# Patient Record
Sex: MALE | Race: WHITE | Employment: UNEMPLOYED | URBAN - METROPOLITAN AREA
[De-identification: names, ages, dates, MRNs, and addresses within clinical notes are randomized per-mention and may not be internally consistent; named-entity substitution may affect disease eponyms.]

---

## 2017-02-20 RX ORDER — CARVEDILOL 25 MG/1
TABLET ORAL
Qty: 180 TABLET | Refills: 0 | Status: SHIPPED | OUTPATIENT
Start: 2017-02-20 | End: 2017-05-18 | Stop reason: SDUPTHER

## 2017-04-13 RX ORDER — SPIRONOLACTONE 25 MG/1
TABLET ORAL
Qty: 90 TABLET | Refills: 0 | Status: SHIPPED | OUTPATIENT
Start: 2017-04-13 | End: 2017-04-18 | Stop reason: SDUPTHER

## 2017-04-18 RX ORDER — LISINOPRIL 20 MG/1
TABLET ORAL
Qty: 30 TABLET | Refills: 0 | Status: SHIPPED | OUTPATIENT
Start: 2017-04-18 | End: 2017-07-17 | Stop reason: SDUPTHER

## 2017-04-18 RX ORDER — SPIRONOLACTONE 25 MG/1
TABLET ORAL
Qty: 30 TABLET | Refills: 0 | Status: SHIPPED | OUTPATIENT
Start: 2017-04-18 | End: 2017-07-17 | Stop reason: SDUPTHER

## 2017-05-18 RX ORDER — CARVEDILOL 25 MG/1
TABLET ORAL
Qty: 180 TABLET | Refills: 1 | Status: SHIPPED | OUTPATIENT
Start: 2017-05-18 | End: 2017-07-17 | Stop reason: SDUPTHER

## 2017-05-19 RX ORDER — LISINOPRIL 20 MG/1
20 TABLET ORAL DAILY
Qty: 30 TABLET | Refills: 6 | Status: SHIPPED | OUTPATIENT
Start: 2017-05-19 | End: 2017-07-17 | Stop reason: SDUPTHER

## 2017-05-23 LAB
AVERAGE GLUCOSE: NORMAL
HBA1C MFR BLD: 5.3 %

## 2017-07-17 ENCOUNTER — PROCEDURE VISIT (OUTPATIENT)
Dept: CARDIOLOGY CLINIC | Age: 62
End: 2017-07-17

## 2017-07-17 ENCOUNTER — OFFICE VISIT (OUTPATIENT)
Dept: CARDIOLOGY CLINIC | Age: 62
End: 2017-07-17

## 2017-07-17 VITALS
DIASTOLIC BLOOD PRESSURE: 72 MMHG | HEIGHT: 72 IN | WEIGHT: 222 LBS | BODY MASS INDEX: 30.07 KG/M2 | HEART RATE: 77 BPM | SYSTOLIC BLOOD PRESSURE: 134 MMHG

## 2017-07-17 DIAGNOSIS — E78.5 HYPERLIPIDEMIA WITH TARGET LDL LESS THAN 70: ICD-10-CM

## 2017-07-17 DIAGNOSIS — Z95.810 AUTOMATIC IMPLANTABLE CARDIOVERTER-DEFIBRILLATOR IN SITU: Primary | ICD-10-CM

## 2017-07-17 DIAGNOSIS — I25.5 CARDIOMYOPATHY, ISCHEMIC: ICD-10-CM

## 2017-07-17 DIAGNOSIS — E78.1 HYPERTRIGLYCERIDEMIA: ICD-10-CM

## 2017-07-17 DIAGNOSIS — I50.22 SYSTOLIC CHF, CHRONIC (HCC): Primary | ICD-10-CM

## 2017-07-17 DIAGNOSIS — Z72.0 TOBACCO ABUSE: ICD-10-CM

## 2017-07-17 DIAGNOSIS — I25.10 CORONARY ARTERY DISEASE INVOLVING NATIVE CORONARY ARTERY OF NATIVE HEART WITHOUT ANGINA PECTORIS: ICD-10-CM

## 2017-07-17 PROCEDURE — G8427 DOCREV CUR MEDS BY ELIG CLIN: HCPCS | Performed by: NURSE PRACTITIONER

## 2017-07-17 PROCEDURE — 99214 OFFICE O/P EST MOD 30 MIN: CPT | Performed by: NURSE PRACTITIONER

## 2017-07-17 PROCEDURE — G8417 CALC BMI ABV UP PARAM F/U: HCPCS | Performed by: NURSE PRACTITIONER

## 2017-07-17 PROCEDURE — 4004F PT TOBACCO SCREEN RCVD TLK: CPT | Performed by: NURSE PRACTITIONER

## 2017-07-17 PROCEDURE — 3017F COLORECTAL CA SCREEN DOC REV: CPT | Performed by: NURSE PRACTITIONER

## 2017-07-17 PROCEDURE — G8598 ASA/ANTIPLAT THER USED: HCPCS | Performed by: NURSE PRACTITIONER

## 2017-07-17 PROCEDURE — 93279 PRGRMG DEV EVAL PM/LDLS PM: CPT | Performed by: INTERNAL MEDICINE

## 2017-07-17 RX ORDER — ATORVASTATIN CALCIUM 40 MG/1
40 TABLET, FILM COATED ORAL DAILY
Qty: 90 TABLET | Refills: 3 | Status: SHIPPED | OUTPATIENT
Start: 2017-07-17 | End: 2018-07-17 | Stop reason: SDUPTHER

## 2017-07-17 RX ORDER — LISINOPRIL 20 MG/1
TABLET ORAL
Qty: 90 TABLET | Refills: 3 | Status: SHIPPED | OUTPATIENT
Start: 2017-07-17 | End: 2018-08-20 | Stop reason: SDUPTHER

## 2017-07-17 RX ORDER — SPIRONOLACTONE 25 MG/1
TABLET ORAL
Qty: 90 TABLET | Refills: 3 | Status: SHIPPED | OUTPATIENT
Start: 2017-07-17 | End: 2018-08-20 | Stop reason: SDUPTHER

## 2017-07-17 RX ORDER — CARVEDILOL 25 MG/1
TABLET ORAL
Qty: 180 TABLET | Refills: 1 | Status: SHIPPED | OUTPATIENT
Start: 2017-07-17 | End: 2018-07-05 | Stop reason: SDUPTHER

## 2017-07-17 RX ORDER — FUROSEMIDE 20 MG/1
20 TABLET ORAL 2 TIMES DAILY
Qty: 180 TABLET | Refills: 3 | Status: SHIPPED | OUTPATIENT
Start: 2017-07-17 | End: 2018-08-20 | Stop reason: SDUPTHER

## 2017-07-17 RX ORDER — BUDESONIDE AND FORMOTEROL FUMARATE DIHYDRATE 160; 4.5 UG/1; UG/1
2 AEROSOL RESPIRATORY (INHALATION) 2 TIMES DAILY
COMMUNITY

## 2018-02-26 ENCOUNTER — TELEPHONE (OUTPATIENT)
Dept: CARDIOLOGY CLINIC | Age: 63
End: 2018-02-26

## 2018-02-26 NOTE — TELEPHONE ENCOUNTER
Continue with compression socks, take meds as prescribed continue daily weights. Looks like he needs a follow up yrn.

## 2018-02-26 NOTE — TELEPHONE ENCOUNTER
Pt called stating he has had swelling off and on in his right leg for the last 5 days. Patient states he was swollen this morning and he put on his compression socks and at time of calling the office the swelling had subsided. Patient stated no other symptoms.

## 2018-02-26 NOTE — TELEPHONE ENCOUNTER
Pt called back and yes he's weighing himself. He is 214 LB now. SOB comes and goes, uses Nebulizer. He is taking all his meds.

## 2018-02-27 NOTE — TELEPHONE ENCOUNTER
Spoke with Zahira Every he says he has had on compression socks and swelling went down. He has appt 3/20 with JUAN ALBERTO. He verbalized understanding.

## 2018-03-13 ENCOUNTER — TELEPHONE (OUTPATIENT)
Dept: CARDIOLOGY CLINIC | Age: 63
End: 2018-03-13

## 2018-03-13 NOTE — TELEPHONE ENCOUNTER
I cannot give clearance until I see him next week. I have not seen him in more than a year.   JUAN ALBERTO

## 2018-03-20 ENCOUNTER — OFFICE VISIT (OUTPATIENT)
Dept: CARDIOLOGY CLINIC | Age: 63
End: 2018-03-20

## 2018-03-20 ENCOUNTER — PROCEDURE VISIT (OUTPATIENT)
Dept: CARDIOLOGY CLINIC | Age: 63
End: 2018-03-20

## 2018-03-20 VITALS
BODY MASS INDEX: 28.71 KG/M2 | DIASTOLIC BLOOD PRESSURE: 70 MMHG | WEIGHT: 212 LBS | OXYGEN SATURATION: 97 % | SYSTOLIC BLOOD PRESSURE: 108 MMHG | HEIGHT: 72 IN | HEART RATE: 80 BPM

## 2018-03-20 DIAGNOSIS — E78.1 HYPERTRIGLYCERIDEMIA: ICD-10-CM

## 2018-03-20 DIAGNOSIS — Z95.810 AUTOMATIC IMPLANTABLE CARDIOVERTER-DEFIBRILLATOR IN SITU: ICD-10-CM

## 2018-03-20 DIAGNOSIS — I25.10 CORONARY ARTERY DISEASE INVOLVING NATIVE CORONARY ARTERY OF NATIVE HEART WITHOUT ANGINA PECTORIS: ICD-10-CM

## 2018-03-20 DIAGNOSIS — I25.5 CARDIOMYOPATHY, ISCHEMIC: ICD-10-CM

## 2018-03-20 DIAGNOSIS — I50.22 SYSTOLIC CHF, CHRONIC (HCC): Primary | ICD-10-CM

## 2018-03-20 DIAGNOSIS — R53.83 FATIGUE, UNSPECIFIED TYPE: ICD-10-CM

## 2018-03-20 DIAGNOSIS — Z01.810 PREOP CARDIOVASCULAR EXAM: ICD-10-CM

## 2018-03-20 DIAGNOSIS — M79.10 MYALGIA: ICD-10-CM

## 2018-03-20 DIAGNOSIS — Z95.810 AUTOMATIC IMPLANTABLE CARDIOVERTER-DEFIBRILLATOR IN SITU: Primary | ICD-10-CM

## 2018-03-20 PROCEDURE — G8427 DOCREV CUR MEDS BY ELIG CLIN: HCPCS | Performed by: INTERNAL MEDICINE

## 2018-03-20 PROCEDURE — G8419 CALC BMI OUT NRM PARAM NOF/U: HCPCS | Performed by: INTERNAL MEDICINE

## 2018-03-20 PROCEDURE — G8484 FLU IMMUNIZE NO ADMIN: HCPCS | Performed by: INTERNAL MEDICINE

## 2018-03-20 PROCEDURE — 4004F PT TOBACCO SCREEN RCVD TLK: CPT | Performed by: INTERNAL MEDICINE

## 2018-03-20 PROCEDURE — G8599 NO ASA/ANTIPLAT THER USE RNG: HCPCS | Performed by: INTERNAL MEDICINE

## 2018-03-20 PROCEDURE — 93282 PRGRMG EVAL IMPLANTABLE DFB: CPT | Performed by: INTERNAL MEDICINE

## 2018-03-20 PROCEDURE — 99214 OFFICE O/P EST MOD 30 MIN: CPT | Performed by: INTERNAL MEDICINE

## 2018-03-20 PROCEDURE — 3017F COLORECTAL CA SCREEN DOC REV: CPT | Performed by: INTERNAL MEDICINE

## 2018-03-20 NOTE — PROGRESS NOTES
08/03/2016    WBC 9.4 03/02/2016    WBC 11.0 10/16/2015    RBC 4.02 08/03/2016    RBC 4.46 03/02/2016    RBC 4.31 10/16/2015    HGB 13.2 08/03/2016    HGB 14.4 03/02/2016    HGB 13.7 10/16/2015    HCT 38.4 08/03/2016    HCT 42.0 03/02/2016    HCT 40.5 10/16/2015    MCV 95.4 08/03/2016    MCV 94.2 03/02/2016    MCV 93.9 10/16/2015    RDW 13.9 08/03/2016    RDW 13.6 03/02/2016    RDW 12.7 10/16/2015     08/03/2016     03/02/2016     10/16/2015     BMP:   Lab Results   Component Value Date     08/03/2016     03/02/2016     10/12/2015    K 4.4 08/03/2016    K 4.8 03/02/2016    K 5.0 10/12/2015     08/03/2016    CL 98 03/02/2016    CL 99 10/12/2015    CO2 29 08/03/2016    CO2 29 03/02/2016    CO2 24 10/12/2015    BUN 14 08/03/2016    BUN 16 03/02/2016    BUN 21 10/12/2015    CREATININE 0.80 08/03/2016    CREATININE 0.8 03/02/2016    CREATININE 0.8 10/12/2015     BNP:   Lab Results   Component Value Date    BNP 93.7 04/25/2013    .2 08/14/2012    .8 06/18/2012     PT/INR:   No results found for: PROTIME  FASTING LIPID PANEL:  Lab Results   Component Value Date    CHOL 135 08/03/2016    HDL 36 08/03/2016    HDL 42 05/24/2012    TRIG 84 08/03/2016   Recent Labs (haley) 2/9/18  , LDL 54, HDL 31,     Assessment:    1. Cardiomyopathy, ischemic : 9/24/15 stress test showed LV cavity is enlarged and has severely reduced left ventricular function  @34% with hypokinesis of the infero-lateral wall     2. Benign hypertensive heart disease with heart failure: stable BP: 108/70     3. Hyperlipidemia: 12/21/15 , Triglycerides 173,  HDL 42,  LDL 66   4. CAD: stable   5. Tobacco abuse : cessation education given   6. CHF:  LV systolic dysfunction:  He is remarkably stable and has been doing a good job of controlling his Fluid weight. Coreg 25 mg po bid. Lisinopril 20 mg po bid for hypertension and CHF  7.   ICD:  3/2/16 Interrogation and programming evaluation

## 2018-03-20 NOTE — PROGRESS NOTES
Patient comes in for programming evaluation for his defibrillator. All sensing and pacing parameters are within normal range. No changes need to be made at this time. Please see interrogation for more detail. In the last 3 months he has had 5 new NSVT/PAT events-longest 14 seconds. Patient to see Dr. Kodi Rizo today. Patient will follow up in 3-6 onths in office. Offered remote monitor again-pt declined.

## 2018-03-20 NOTE — LETTER
 furosemide (LASIX) 20 MG tablet Take 1 tablet by mouth 2 times daily 180 tablet 3    aspirin 325 MG tablet Take 1 tablet by mouth daily (Patient taking differently: Take 500 mg by mouth daily Takes 500 mg ASA one in the morning and one in the evening) 30 tablet 3    albuterol sulfate HFA (VENTOLIN HFA) 108 (90 BASE) MCG/ACT inhaler Inhale 2 puffs into the lungs every 6 hours as needed for Wheezing 1 Inhaler 0    albuterol (PROVENTIL) (2.5 MG/3ML) 0.083% nebulizer solution Take 3 mLs by nebulization every 6 hours as needed for Wheezing 120 each 0    acetaminophen (TYLENOL) 325 MG tablet Take 650 mg by mouth every 6 hours as needed for Pain      metFORMIN (GLUCOPHAGE) 1000 MG tablet TAKE 1 TABLET BY MOUTH TWO TIMES A DAY WITH MEALS 75 tablet 11    potassium phosphate, monobasic, (K-PHOS) 500 MG tablet Take 500 mg by mouth daily. OTC      multivitamin (THERAGRAN) per tablet Take 1 tablet by mouth daily.  nabumetone (RELAFEN) 750 MG tablet Take 750 mg by mouth daily      glipiZIDE (GLUCOTROL) 5 MG tablet TAKE ONE TABLET BY MOUTH TWICE A DAY BEFORE MEALS (Patient taking differently: TAKE ONE TABLET BY MOUTH TWICE A DAY BEFORE MEALS prn) 60 tablet 0    glipiZIDE (GLUCOTROL) 5 MG tablet TAKE ONE TABLET BY MOUTH with dinner (Patient taking differently: TAKE ONE TABLET BY MOUTH with dinner prn) 90 tablet 0     No current facility-administered medications for this visit.         Immunization History   Administered Date(s) Administered    Influenza Virus Vaccine 01/22/2007, 11/11/2015    Pneumococcal Polysaccharide ([id number]) 11/11/2015    Tdap (Boostrix, Adacel) 04/16/2010       Patient Active Problem List   Diagnosis    Automatic implantable cardioverter-defibrillator in situ    Tobacco abuse    Diabetes type 2, controlled ([address]    Moderate COPD [facility]ch[facility] obstructive [facility] disease) [facility]    Cardiomyopathy, ischemic    Coronary artery disease involving native coronary artery of native heart

## 2018-03-23 ENCOUNTER — OFFICE VISIT (OUTPATIENT)
Dept: VASCULAR SURGERY | Age: 63
End: 2018-03-23

## 2018-03-23 VITALS
HEIGHT: 72 IN | HEART RATE: 89 BPM | WEIGHT: 211.6 LBS | OXYGEN SATURATION: 100 % | BODY MASS INDEX: 28.66 KG/M2 | DIASTOLIC BLOOD PRESSURE: 78 MMHG | SYSTOLIC BLOOD PRESSURE: 128 MMHG

## 2018-03-23 DIAGNOSIS — Z98.890 H/O CAROTID ENDARTERECTOMY: ICD-10-CM

## 2018-03-23 DIAGNOSIS — R09.89 LEFT CAROTID BRUIT: ICD-10-CM

## 2018-03-23 DIAGNOSIS — I72.2 RENAL ARTERY ANEURYSM (HCC): ICD-10-CM

## 2018-03-23 PROCEDURE — G8427 DOCREV CUR MEDS BY ELIG CLIN: HCPCS | Performed by: SURGERY

## 2018-03-23 PROCEDURE — G8419 CALC BMI OUT NRM PARAM NOF/U: HCPCS | Performed by: SURGERY

## 2018-03-23 PROCEDURE — G8484 FLU IMMUNIZE NO ADMIN: HCPCS | Performed by: SURGERY

## 2018-03-23 PROCEDURE — 3017F COLORECTAL CA SCREEN DOC REV: CPT | Performed by: SURGERY

## 2018-03-23 PROCEDURE — 99214 OFFICE O/P EST MOD 30 MIN: CPT | Performed by: SURGERY

## 2018-03-23 NOTE — COMMUNICATION BODY
pulmonary disease) [facility]     Diabetes mellitus ([facility] [facility]     Edema     lower extremities    History of right-sided carotid endarterectomy 10/15/2015    Hyperlipidemia     mixed    Hyperlipidemia     Hypertension     Metabolic syndrome     MI (myocardial infarction) 1991    Pneumonia     Pulmonary hypertension     Smoker     Tobacco abuse     Type II or unspecified type diabetes mellitus without mention of complication, not stated as uncontrolled      Past Surgical History:   Procedure Laterality Date    CARDIAC DEFIBRILLATOR PLACEMENT  2010    CAROTID ENDARTERECTOMY Right 10/15/15    right carotid endarterectomy    CORONARY ANGIOPLASTY WITH STENT PLACEMENT  1989    INGUINAL HERNIA REPAIR Left 2013     Family History   Problem Relation Age of Onset    Heart Disease Mother     Alcohol Abuse Mother     Other Father     Alcohol Abuse Father     Heart Disease Father     Alcohol Abuse Sister     Heart Disease Sister      CHF    COPD Sister     High Blood Pressure Sister     Heart Disease Maternal Grandfather      Social History     Social History    Marital status:      Spouse name: N/A    Number of children: N/A    Years of education: N/A     Occupational History    Not on file. Social History Main Topics    Smoking status: Current Every Day Smoker     Packs/day: 1.00     Years: 46.00     Types: Cigarettes    Smokeless tobacco: Never Used    Alcohol use 0.0 oz/week    Drug use: No    Sexual activity: Yes     Partners: Female      Comment: Rare     Other Topics Concern    Not on file     Social History Narrative    ** Merged History Encounter **          Review of Systems:   · Constitutional: there has been no unanticipated weight loss. There's been no change in energy level, sleep pattern, or activity level. · Eyes: No visual changes or diplopia. No scleral icterus. · ENT: No Headaches, hearing loss or vertigo. No mouth sores or sore throat.   · Cardiovascular: Reviewed in

## 2018-03-23 NOTE — PROGRESS NOTES
Outpatient Consultation / H&P    Date of Consultation:  3/23/2018    PCP:  Toby Gómez CNP     Referring Provider:  Dr. Shira Breen     Chief Complaint:   Chief Complaint   Patient presents with    Other     patient referred by Dr Jeimy Lozano for renal artery aneurysm, as well as thoracic ascending and descending. pamlr        History of Present Illness: We are asked to see this patient in consultation by Dr. Shira Breen regarding CT findings. Bettye Edwards is a 58 y.o. male who underwent CT scan for lung cancer screening. Our cuts of the scan demonstrated rim calcification in the left renal hilum consistent with a small renal artery aneurysm. He does have a history of vascular disease- I had performed a right carotid endarterectomy in 2015. Patient denies any chest pain back pain or abdominal pain. He denies any renal insufficiency or hematuria.      Past Medical History:  Past Medical History:   Diagnosis Date    Anxiety     Asymptomatic stenosis of right carotid artery 10/2/2015    CAD (coronary artery disease)     CAD (coronary artery disease)     angioplasty/stents 1989    Cardiac defibrillator in place     Cardiomyopathy ([address]     LVEF - approx 25%    COPD (chronic obstructive pulmonary disease) (HCC)     Diabetes mellitus ([address]     Edema     lower extremities    History of right-sided carotid endarterectomy 10/15/2015    Hyperlipidemia     mixed    Hyperlipidemia     Hypertension     Metabolic syndrome     MI (myocardial infarction) 1991    Pneumonia     Pulmonary hypertension     Renal artery aneurysm ([facility] [address] [address] 3/23/2018    Smoker     Tobacco abuse     Type II or unspecified type diabetes mellitus without mention of complication, not stated as uncontrolled        Past Surgical History:  Past Surgical History:   Procedure Laterality Date    CARDIAC DEFIBRILLATOR PLACEMENT  2010    CAROTID ENDARTERECTOMY Right 10/15/15    right carotid endarterectomy    CORONARY ANGIOPLASTY WITH STENT (GLUCOTROL) 5 MG tablet TAKE ONE TABLET BY MOUTH TWICE A DAY BEFORE MEALS  Patient taking differently: TAKE ONE TABLET BY MOUTH TWICE A DAY BEFORE MEALS prn 7/12/16   Agatha Cardenas MD   glipiZIDE (GLUCOTROL) 5 MG tablet TAKE ONE TABLET BY MOUTH with dinner  Patient taking differently: TAKE ONE TABLET BY MOUTH with dinner prn 5/4/16   Franklin Cardenas MD        Allergies:  Celexa [citalopram hydrobromide] and Lipitor [atorvastatin]     Social History:      Social History     Social History    Marital status:      Spouse name: N/A    Number of children: N/A    Years of education: N/A     Occupational History    Not on file. Social History Main Topics    Smoking status: Current Every Day Smoker     Packs/day: 1.00     Years: 46.00     Types: Cigarettes    Smokeless tobacco: Never Used    Alcohol use 0.0 oz/week    Drug use: No    Sexual activity: Yes     Partners: Female      Comment: Rare     Other Topics Concern    Not on file     Social History Narrative    ** Merged History Encounter **            Family History:        Problem Relation Age of Onset    Heart Disease Mother     Alcohol Abuse Mother     Other Father     Alcohol Abuse Father     Heart Disease Father     Alcohol Abuse Sister     Heart Disease Sister      CHF    COPD Sister     High Blood Pressure Sister     Heart Disease Maternal Grandfather        Review of Systems:  A 14 point review of systems was completed. Pertinent positives identified in the HPI, all other review of systems negative. Physical Examination:    /78 (Site: Left Arm)   Pulse 89   Ht 6' (1.829 m)   Wt 211 lb 9.6 oz (96 kg)   SpO2 100%   BMI 28.70 kg/m²     Weight: 211 lb 9.6 oz (96 kg)       General appearance: NAD  Eyes: PERRLA  Neck: no JVD, no lymphadenopathy. Respiratory: effort is unlabored, no crackles, wheezes or rubs. Cardiovascular: regular, no murmur. Left carotid bruits. Well-healed right endarterectomy scar.   Pulses:    radial

## 2018-03-26 ENCOUNTER — TELEPHONE (OUTPATIENT)
Dept: CARDIOTHORACIC SURGERY | Age: 63
End: 2018-03-26

## 2018-03-31 PROBLEM — R07.9 CHEST PAIN: Status: ACTIVE | Noted: 2018-03-31

## 2018-04-02 ENCOUNTER — TELEPHONE (OUTPATIENT)
Dept: CARDIOLOGY CLINIC | Age: 63
End: 2018-04-02

## 2018-04-03 ENCOUNTER — TELEPHONE (OUTPATIENT)
Dept: CARDIOLOGY CLINIC | Age: 63
End: 2018-04-03

## 2018-04-04 ENCOUNTER — HOSPITAL ENCOUNTER (OUTPATIENT)
Dept: SURGERY | Age: 63
Discharge: HOME | End: 2018-04-23
Admitting: INTERNAL MEDICINE

## 2018-04-04 VITALS
WEIGHT: 199 LBS | RESPIRATION RATE: 18 BRPM | HEART RATE: 89 BPM | SYSTOLIC BLOOD PRESSURE: 123 MMHG | TEMPERATURE: 97.5 F | OXYGEN SATURATION: 95 % | BODY MASS INDEX: 26.95 KG/M2 | DIASTOLIC BLOOD PRESSURE: 88 MMHG | HEIGHT: 72 IN

## 2018-04-04 DIAGNOSIS — Z12.11 ENCOUNTER FOR SCREENING FOR MALIGNANT NEOPLASM OF COLON: ICD-10-CM

## 2018-04-04 LAB
GLUCOSE BLD-MCNC: 120 MG/DL (ref 70–99)
PERFORMED ON: ABNORMAL

## 2018-04-04 RX ORDER — LIDOCAINE HYDROCHLORIDE 10 MG/ML
1 INJECTION, SOLUTION EPIDURAL; INFILTRATION; INTRACAUDAL; PERINEURAL ONCE
Status: DISCONTINUED | OUTPATIENT
Start: 2018-04-04 | End: 2018-04-06 | Stop reason: HOSPADM

## 2018-04-04 RX ORDER — SODIUM CHLORIDE, SODIUM LACTATE, POTASSIUM CHLORIDE, CALCIUM CHLORIDE 600; 310; 30; 20 MG/100ML; MG/100ML; MG/100ML; MG/100ML
1000 INJECTION, SOLUTION INTRAVENOUS ONCE
Status: COMPLETED | OUTPATIENT
Start: 2018-04-04 | End: 2018-04-04

## 2018-04-04 RX ORDER — TRAMADOL HYDROCHLORIDE 50 MG/1
50 TABLET ORAL EVERY 6 HOURS PRN
COMMUNITY
End: 2018-04-23 | Stop reason: ALTCHOICE

## 2018-04-04 RX ADMIN — SODIUM CHLORIDE, SODIUM LACTATE, POTASSIUM CHLORIDE, CALCIUM CHLORIDE 1000 ML: 600; 310; 30; 20 INJECTION, SOLUTION INTRAVENOUS at 07:11

## 2018-04-04 ASSESSMENT — PAIN SCALES - GENERAL
PAINLEVEL_OUTOF10: 0

## 2018-04-04 ASSESSMENT — PAIN - FUNCTIONAL ASSESSMENT: PAIN_FUNCTIONAL_ASSESSMENT: 0-10

## 2018-04-10 ENCOUNTER — HOSPITAL ENCOUNTER (OUTPATIENT)
Dept: VASCULAR LAB | Age: 63
Discharge: HOME | End: 2018-04-04
Attending: INTERNAL MEDICINE | Admitting: INTERNAL MEDICINE

## 2018-04-10 ENCOUNTER — HOSPITAL ENCOUNTER (OUTPATIENT)
Dept: VASCULAR LAB | Age: 63
Discharge: HOME | End: 2018-04-10
Attending: SURGERY | Admitting: SURGERY

## 2018-04-10 DIAGNOSIS — I65.23 OCCLUSION AND STENOSIS OF BILATERAL CAROTID ARTERIES: ICD-10-CM

## 2018-04-12 ENCOUNTER — TELEPHONE (OUTPATIENT)
Dept: CARDIOTHORACIC SURGERY | Age: 63
End: 2018-04-12

## 2018-04-16 ENCOUNTER — TELEPHONE (OUTPATIENT)
Dept: CARDIOTHORACIC SURGERY | Age: 63
End: 2018-04-16

## 2018-04-16 DIAGNOSIS — R09.89 LEFT CAROTID BRUIT: Primary | ICD-10-CM

## 2018-04-19 PROBLEM — Z01.810 PREOP CARDIOVASCULAR EXAM: Status: RESOLVED | Noted: 2018-03-20 | Resolved: 2018-04-19

## 2018-04-20 ENCOUNTER — HOSPITAL ENCOUNTER (OUTPATIENT)
Dept: CT IMAGING | Age: 63
Discharge: HOME | End: 2018-04-20
Attending: SURGERY | Admitting: SURGERY

## 2018-04-20 ENCOUNTER — TELEPHONE (OUTPATIENT)
Dept: CARDIOTHORACIC SURGERY | Age: 63
End: 2018-04-20

## 2018-04-20 DIAGNOSIS — Z12.11 ENCOUNTER FOR SCREENING FOR MALIGNANT NEOPLASM OF COLON: ICD-10-CM

## 2018-04-20 DIAGNOSIS — R09.89 LEFT CAROTID BRUIT: ICD-10-CM

## 2018-04-23 ENCOUNTER — SURG/PROC ORDERS (OUTPATIENT)
Dept: CARDIOTHORACIC SURGERY | Age: 63
End: 2018-04-23

## 2018-04-23 ENCOUNTER — TELEPHONE (OUTPATIENT)
Dept: CARDIOTHORACIC SURGERY | Age: 63
End: 2018-04-23

## 2018-04-23 DIAGNOSIS — I65.22 OCCLUSION OF LEFT CAROTID ARTERY: Primary | ICD-10-CM

## 2018-04-24 ENCOUNTER — HOSPITAL ENCOUNTER (OUTPATIENT)
Dept: OTHER | Age: 63
Discharge: HOME | End: 2018-04-24
Attending: SURGERY | Admitting: SURGERY

## 2018-04-24 ENCOUNTER — TELEPHONE (OUTPATIENT)
Dept: CARDIOLOGY CLINIC | Age: 63
End: 2018-04-24

## 2018-04-24 ENCOUNTER — HOSPITAL ENCOUNTER (OUTPATIENT)
Dept: CARDIOLOGY | Facility: CLINIC | Age: 63
Discharge: HOME | End: 2018-04-24
Attending: INTERNAL MEDICINE | Admitting: INTERNAL MEDICINE

## 2018-04-24 DIAGNOSIS — Z01.810 ENCOUNTER FOR PREPROCEDURAL CARDIOVASCULAR EXAMINATION: ICD-10-CM

## 2018-04-24 LAB
BILIRUBIN URINE: NEGATIVE
BLOOD, URINE: NEGATIVE
CLARITY: CLEAR
COLOR: YELLOW
GLUCOSE URINE: NEGATIVE MG/DL
KETONES, URINE: NEGATIVE MG/DL
LEUKOCYTE ESTERASE, URINE: NEGATIVE
LV EF: 36 %
LVEF MODALITY: NORMAL
MICROSCOPIC EXAMINATION: NORMAL
NITRITE, URINE: NEGATIVE
PH UA: 5.5
PROTEIN UA: NEGATIVE MG/DL
SPECIFIC GRAVITY UA: 1.01
URINE TYPE: NORMAL
UROBILINOGEN, URINE: 0.2 E.U./DL

## 2018-04-25 PROBLEM — Z98.890 S/P CAROTID ENDARTERECTOMY: Status: ACTIVE | Noted: 2018-04-25

## 2018-04-25 PROBLEM — I65.22 STENOSIS OF LEFT CAROTID ARTERY: Status: ACTIVE | Noted: 2018-04-25

## 2018-05-18 ENCOUNTER — OFFICE VISIT (OUTPATIENT)
Dept: VASCULAR SURGERY | Age: 63
End: 2018-05-18

## 2018-05-18 VITALS
BODY MASS INDEX: 26.95 KG/M2 | SYSTOLIC BLOOD PRESSURE: 100 MMHG | HEART RATE: 78 BPM | DIASTOLIC BLOOD PRESSURE: 60 MMHG | OXYGEN SATURATION: 96 % | HEIGHT: 72 IN | WEIGHT: 199 LBS

## 2018-05-18 DIAGNOSIS — Z48.812 POSTOP CAROTID ENDARTERECTOMY SURVEILLANCE, ENCOUNTER FOR: ICD-10-CM

## 2018-05-18 DIAGNOSIS — Z09 POSTOP CHECK: Primary | ICD-10-CM

## 2018-05-18 PROCEDURE — 99024 POSTOP FOLLOW-UP VISIT: CPT | Performed by: SURGERY

## 2018-07-05 RX ORDER — CARVEDILOL 25 MG/1
TABLET ORAL
Qty: 180 TABLET | Refills: 2 | Status: SHIPPED | OUTPATIENT
Start: 2018-07-05

## 2018-07-17 DIAGNOSIS — E78.5 HYPERLIPIDEMIA WITH TARGET LDL LESS THAN 70: ICD-10-CM

## 2018-07-17 RX ORDER — ATORVASTATIN CALCIUM 40 MG/1
TABLET, FILM COATED ORAL
Qty: 90 TABLET | Refills: 3 | Status: SHIPPED | OUTPATIENT
Start: 2018-07-17 | End: 2019-02-08 | Stop reason: DRUGHIGH

## 2018-08-06 ENCOUNTER — OFFICE VISIT (OUTPATIENT)
Dept: URGENT CARE | Age: 63
End: 2018-08-06

## 2018-08-06 VITALS
RESPIRATION RATE: 24 BRPM | TEMPERATURE: 98.6 F | HEART RATE: 81 BPM | DIASTOLIC BLOOD PRESSURE: 68 MMHG | WEIGHT: 199 LBS | HEIGHT: 72 IN | SYSTOLIC BLOOD PRESSURE: 110 MMHG | OXYGEN SATURATION: 93 % | BODY MASS INDEX: 26.95 KG/M2

## 2018-08-06 DIAGNOSIS — J40 BRONCHITIS: Primary | ICD-10-CM

## 2018-08-06 PROCEDURE — 99203 OFFICE O/P NEW LOW 30 MIN: CPT | Performed by: EMERGENCY MEDICINE

## 2018-08-06 PROCEDURE — G8427 DOCREV CUR MEDS BY ELIG CLIN: HCPCS | Performed by: EMERGENCY MEDICINE

## 2018-08-06 PROCEDURE — 4004F PT TOBACCO SCREEN RCVD TLK: CPT | Performed by: EMERGENCY MEDICINE

## 2018-08-06 PROCEDURE — G8598 ASA/ANTIPLAT THER USED: HCPCS | Performed by: EMERGENCY MEDICINE

## 2018-08-06 PROCEDURE — 3017F COLORECTAL CA SCREEN DOC REV: CPT | Performed by: EMERGENCY MEDICINE

## 2018-08-06 PROCEDURE — G8419 CALC BMI OUT NRM PARAM NOF/U: HCPCS | Performed by: EMERGENCY MEDICINE

## 2018-08-06 RX ORDER — BENZONATATE 100 MG/1
200 CAPSULE ORAL 3 TIMES DAILY PRN
Qty: 30 CAPSULE | Refills: 0 | Status: SHIPPED | OUTPATIENT
Start: 2018-08-06 | End: 2018-08-13

## 2018-08-06 RX ORDER — AZITHROMYCIN 250 MG/1
TABLET, FILM COATED ORAL
Qty: 1 PACKET | Refills: 0 | Status: SHIPPED | OUTPATIENT
Start: 2018-08-06 | End: 2018-08-16 | Stop reason: ALTCHOICE

## 2018-08-06 RX ORDER — ALBUTEROL SULFATE 90 UG/1
2 AEROSOL, METERED RESPIRATORY (INHALATION) EVERY 6 HOURS PRN
Qty: 1 INHALER | Refills: 3 | Status: SHIPPED | OUTPATIENT
Start: 2018-08-06

## 2018-08-06 ASSESSMENT — ENCOUNTER SYMPTOMS
WHEEZING: 1
COUGH: 1
SHORTNESS OF BREATH: 1
SORE THROAT: 1

## 2018-08-06 NOTE — PATIENT INSTRUCTIONS
Follow-up with your primary care physician in 1 week if not improving. If you do not have a primary care physician, call [phone] for a referral or visit [email or web address]    Patient Education        Bronchitis: Care Instructions  Your Care Instructions    Bronchitis is inflammation of the bronchial tubes, which carry air to the lungs. The tubes swell and produce mucus, or phlegm. The mucus and inflamed bronchial tubes make you cough. You may have trouble breathing. Most cases of bronchitis are caused by viruses like those that cause colds. Antibiotics usually do not help and they may be harmful. Bronchitis usually develops rapidly and lasts about 2 to 3 weeks in otherwise healthy people. Follow-up care is a key part of your treatment and safety. Be sure to make and go to all appointments, and call your doctor if you are having problems. It's also a good idea to know your test results and keep a list of the medicines you take. How can you care for yourself at home? · Take all medicines exactly as prescribed. Call your doctor if you think you are having a problem with your medicine. · Get some extra rest.  · Take an over-the-counter pain medicine, such as acetaminophen (Tylenol), ibuprofen (Advil, Motrin), or naproxen (Aleve) to reduce fever and relieve body aches. Read and follow all instructions on the label. · Do not take two or more pain medicines at the same time unless the doctor told you to. Many pain medicines have acetaminophen, which is Tylenol. Too much acetaminophen (Tylenol) can be harmful. · Take an over-the-counter cough medicine that contains dextromethorphan to help quiet a dry, hacking cough so that you can sleep. Avoid cough medicines that have more than one active ingredient. Read and follow all instructions on the label. · Breathe moist air from a humidifier, hot shower, or sink filled with hot water. The heat and moisture will thin mucus so you can cough it out.   · Do not

## 2018-08-06 NOTE — PROGRESS NOTES
Subjective:      Patient ID: Brooklyn Rico is a 61 y.o. male. Cough   This is a new problem. Episode onset: 2 days ago. The problem has been gradually worsening. The problem occurs every few minutes. The cough is productive of sputum. Associated symptoms include a sore throat, shortness of breath and wheezing. Pertinent negatives include no chest pain, chills or fever. Nothing aggravates the symptoms. He has tried a beta-agonist inhaler for the symptoms. The treatment provided mild relief. His past medical history is significant for bronchitis and COPD. Review of Systems   Constitutional: Negative for chills and fever. HENT: Positive for congestion and sore throat. Respiratory: Positive for cough, shortness of breath and wheezing. Cardiovascular: Negative for chest pain. All other systems reviewed and are negative. Objective:   Physical Exam   Constitutional: He is oriented to person, place, and time. He appears well-developed and well-nourished. No distress. HENT:   Head: Normocephalic and atraumatic. Right Ear: External ear normal.   Left Ear: External ear normal.   Nose: Nose normal.   Mouth/Throat: Oropharynx is clear and moist. No oropharyngeal exudate. Eyes: Conjunctivae and EOM are normal. Pupils are equal, round, and reactive to light. Neck: Normal range of motion. Neck supple. Cardiovascular: Normal rate, regular rhythm, normal heart sounds and intact distal pulses. No murmur heard. Pulmonary/Chest: Effort normal and breath sounds normal. No respiratory distress. He has no wheezes. He has no rales. He exhibits no tenderness. Musculoskeletal: Normal range of motion. Lymphadenopathy:     He has no cervical adenopathy. Neurological: He is alert and oriented to person, place, and time. He has normal reflexes. No cranial nerve deficit. He exhibits normal muscle tone. Coordination normal.   Skin: Skin is warm and dry. He is not diaphoretic.    Psychiatric: He has a normal mood and affect. His behavior is normal. Judgment and thought content normal.   Nursing note and vitals reviewed. Assessment:      1. Bronchitis            Plan:      Wesley Bradshaw was seen today for cough. Diagnoses and all orders for this visit:    Bronchitis    Other orders  -     azithromycin (ZITHROMAX) 250 MG tablet; Two tablets by mouth once a day  for one day then one tablet by mouth once a day for four days  -     albuterol sulfate HFA (PROVENTIL HFA) 108 (90 Base) MCG/ACT inhaler; Inhale 2 puffs into the lungs every 6 hours as needed for Wheezing  -     benzonatate (TESSALON PERLES) 100 MG capsule;  Take 2 capsules by mouth 3 times daily as needed for Cough

## 2018-08-16 ENCOUNTER — OFFICE VISIT (OUTPATIENT)
Dept: FAMILY MEDICINE CLINIC | Age: 63
End: 2018-08-16

## 2018-08-16 VITALS
BODY MASS INDEX: 26.61 KG/M2 | SYSTOLIC BLOOD PRESSURE: 120 MMHG | WEIGHT: 196.2 LBS | HEART RATE: 73 BPM | DIASTOLIC BLOOD PRESSURE: 76 MMHG | OXYGEN SATURATION: 98 %

## 2018-08-16 DIAGNOSIS — E78.5 HYPERLIPIDEMIA WITH TARGET LDL LESS THAN 70: ICD-10-CM

## 2018-08-16 DIAGNOSIS — M15.9 PRIMARY OSTEOARTHRITIS INVOLVING MULTIPLE JOINTS: ICD-10-CM

## 2018-08-16 DIAGNOSIS — M21.611 BUNION, RIGHT FOOT: ICD-10-CM

## 2018-08-16 DIAGNOSIS — N52.9 ERECTILE DYSFUNCTION, UNSPECIFIED ERECTILE DYSFUNCTION TYPE: ICD-10-CM

## 2018-08-16 DIAGNOSIS — E11.8 CONTROLLED TYPE 2 DIABETES MELLITUS WITH COMPLICATION, WITHOUT LONG-TERM CURRENT USE OF INSULIN (HCC): Primary | ICD-10-CM

## 2018-08-16 LAB
CREATININE URINE POCT: NORMAL
HBA1C MFR BLD: 6 %
MICROALBUMIN/CREAT 24H UR: NORMAL MG/G{CREAT}
MICROALBUMIN/CREAT UR-RTO: NORMAL

## 2018-08-16 PROCEDURE — 2022F DILAT RTA XM EVC RTNOPTHY: CPT | Performed by: FAMILY MEDICINE

## 2018-08-16 PROCEDURE — 82044 UR ALBUMIN SEMIQUANTITATIVE: CPT | Performed by: FAMILY MEDICINE

## 2018-08-16 PROCEDURE — G8427 DOCREV CUR MEDS BY ELIG CLIN: HCPCS | Performed by: FAMILY MEDICINE

## 2018-08-16 PROCEDURE — G8419 CALC BMI OUT NRM PARAM NOF/U: HCPCS | Performed by: FAMILY MEDICINE

## 2018-08-16 PROCEDURE — 99203 OFFICE O/P NEW LOW 30 MIN: CPT | Performed by: FAMILY MEDICINE

## 2018-08-16 PROCEDURE — 3044F HG A1C LEVEL LT 7.0%: CPT | Performed by: FAMILY MEDICINE

## 2018-08-16 PROCEDURE — 4004F PT TOBACCO SCREEN RCVD TLK: CPT | Performed by: FAMILY MEDICINE

## 2018-08-16 PROCEDURE — 3017F COLORECTAL CA SCREEN DOC REV: CPT | Performed by: FAMILY MEDICINE

## 2018-08-16 PROCEDURE — 83036 HEMOGLOBIN GLYCOSYLATED A1C: CPT | Performed by: FAMILY MEDICINE

## 2018-08-16 PROCEDURE — G8598 ASA/ANTIPLAT THER USED: HCPCS | Performed by: FAMILY MEDICINE

## 2018-08-16 RX ORDER — SILDENAFIL CITRATE 20 MG/1
20 TABLET ORAL PRN
Qty: 30 TABLET | Refills: 0 | Status: SHIPPED | OUTPATIENT
Start: 2018-08-16

## 2018-08-16 RX ORDER — BENZONATATE 100 MG/1
100 CAPSULE ORAL 3 TIMES DAILY PRN
COMMUNITY

## 2018-08-16 ASSESSMENT — ENCOUNTER SYMPTOMS: WHEEZING: 1

## 2018-08-16 ASSESSMENT — PATIENT HEALTH QUESTIONNAIRE - PHQ9
1. LITTLE INTEREST OR PLEASURE IN DOING THINGS: 0
SUM OF ALL RESPONSES TO PHQ QUESTIONS 1-9: 0
2. FEELING DOWN, DEPRESSED OR HOPELESS: 0
SUM OF ALL RESPONSES TO PHQ9 QUESTIONS 1 & 2: 0
SUM OF ALL RESPONSES TO PHQ QUESTIONS 1-9: 0

## 2018-08-16 NOTE — PROGRESS NOTES
inhaler Inhale 2 puffs into the lungs every 6 hours as needed for Wheezing 1 Inhaler 0    albuterol (PROVENTIL) (2.5 MG/3ML) 0.083% nebulizer solution Take 3 mLs by nebulization every 6 hours as needed for Wheezing 120 each 0    acetaminophen (TYLENOL) 325 MG tablet Take 650 mg by mouth every 6 hours as needed for Pain      metFORMIN (GLUCOPHAGE) 1000 MG tablet TAKE 1 TABLET BY MOUTH TWO TIMES A DAY WITH MEALS 75 tablet 11    potassium phosphate, monobasic, (K-PHOS) 500 MG tablet Take 500 mg by mouth daily. OTC      multivitamin (THERAGRAN) per tablet Take 1 tablet by mouth daily. No current facility-administered medications for this visit.         Immunization History   Administered Date(s) Administered    Influenza Virus Vaccine 01/22/2007, 11/11/2015, 01/28/2018    Pneumococcal 13-valent Conjugate ([id number]) 02/21/2018    Pneumococcal Polysaccharide ([id number]) 11/11/2015    Tdap (Boostrix, Adacel) 04/16/2010       Allergies   Allergen Reactions    Celexa [Citalopram Hydrobromide]      Lack of concentration and not feel right       Admission on 04/25/2018, Discharged on 04/26/2018   Component Date Value Ref Range Status    POC Glucose 04/25/2018 133* 70 - 99 mg/dl Final    Performed on 04/25/2018 ACCU-CHEK   Final    WBC 04/25/2018 7.2  4.0 - 11.0 K/uL Final    RBC 04/25/2018 3.93* 4.20 - 5.90 M/uL Final    Hemoglobin 04/25/2018 12.9* 13.5 - 17.5 g/dL Final    Hematocrit 04/25/2018 37.1* 40.5 - 52.5 % Final    MCV 04/25/2018 94.3  80.0 - 100.0 fL Final    MCH 04/25/2018 32.8  26.0 - 34.0 pg Final    MCHC 04/25/2018 34.7  31.0 - 36.0 g/dL Final    RDW 04/25/2018 13.0  12.4 - 15.4 % Final    Platelets 03/95/0662 177  135 - 450 K/uL Final    MPV 04/25/2018 8.7  5.0 - 10.5 fL Final    Neutrophils % 04/25/2018 84.5  % Final    Lymphocytes % 04/25/2018 10.3  % Final    Monocytes % 04/25/2018 4.1  % Final    Eosinophils % 04/25/2018 0.8  % Final    Basophils % 04/25/2018 0.3  % Final  Neutrophils # 04/25/2018 6.1  1.7 - 7.7 K/uL Final    Lymphocytes # 04/25/2018 0.7* 1.0 - 5.1 K/uL Final    Monocytes # 04/25/2018 0.3  0.0 - 1.3 K/uL Final    Eosinophils # 04/25/2018 0.1  0.0 - 0.6 K/uL Final    Basophils # 04/25/2018 0.0  0.0 - 0.2 K/uL Final    Magnesium 04/25/2018 1.60* 1.80 - 2.40 mg/dL Final    Sodium 04/25/2018 137  136 - 145 mmol/L Final    Potassium 04/25/2018 4.2  3.5 - 5.1 mmol/L Final    Chloride 04/25/2018 101  99 - 110 mmol/L Final    CO2 04/25/2018 26  21 - 32 mmol/L Final    Anion Gap 04/25/2018 10  3 - 16 Final    Glucose 04/25/2018 138* 70 - 99 mg/dL Final    BUN 04/25/2018 11  7 - 20 mg/dL Final    CREATININE 04/25/2018 0.6* 0.8 - 1.3 mg/dL Final    GFR Non- 04/25/2018 >60  >60 Final    Comment: >60 mL/min/1.73m2 EGFR, calc. for ages 25 and older using the  MDRD formula (not corrected for weight), is valid for stable  renal function.  GFR  04/25/2018 >60  >60 Final    Comment: Chronic Kidney Disease: less than 60 ml/min/1.73 sq.m. Kidney Failure: less than 15 ml/min/1.73 sq.m. Results valid for patients 18 years and older.       Calcium 04/25/2018 8.5  8.3 - 10.6 mg/dL Final    TSH Reflex FT4 04/25/2018 1.85  0.27 - 4.20 uIU/mL Final    POC Glucose 04/25/2018 123* 70 - 99 mg/dl Final    Performed on 04/25/2018 ACCU-CHEK   Final    Hemoglobin A1C 04/25/2018 5.9  See comment % Final    Comment: Comment:  Diagnosis of Diabetes: > or = 6.5%  Increased risk of diabetes (Prediabetes): 5.7-6.4%  Glycemic Control: Nonpregnant Adults: <7.0%                    Pregnant: <6.0%        eAG 04/25/2018 122.6  mg/dL Final    POC Glucose 04/25/2018 203* 70 - 99 mg/dl Final    Performed on 04/25/2018 ACCU-CHEK   Final    WBC 04/26/2018 9.5  4.0 - 11.0 K/uL Final    RBC 04/26/2018 3.86* 4.20 - 5.90 M/uL Final    Hemoglobin 04/26/2018 12.6* 13.5 - 17.5 g/dL Final    Hematocrit 04/26/2018 36.8* 40.5 - 52.5 % Final    MCV 04/26/2018 95.1  80.0 - 100.0 fL Final    MCH 04/26/2018 32.6  26.0 - 34.0 pg Final    MCHC 04/26/2018 34.3  31.0 - 36.0 g/dL Final    RDW 04/26/2018 13.3  12.4 - 15.4 % Final    Platelets 56/31/5017 182  135 - 450 K/uL Final    MPV 04/26/2018 8.6  5.0 - 10.5 fL Final    POC Glucose 04/25/2018 152* 70 - 99 mg/dl Final    Performed on 04/25/2018 ACCU-CHEK   Final    POC Glucose 04/26/2018 164* 70 - 99 mg/dl Final    Performed on 04/26/2018 ACCU-CHEK   Final       Review of Systems   Respiratory: Positive for wheezing. Musculoskeletal: Positive for arthralgias (bilateral ankles, right knee, hips, LE's). Psychiatric/Behavioral: Positive for dysphoric mood (improved). /76 (Site: Left Arm, Position: Sitting, Cuff Size: Large Adult)   Pulse 73   Wt 196 lb 3.2 oz (89 kg)   SpO2 98%   BMI 26.61 kg/m²     Physical Exam   Constitutional: He is oriented to person, place, and time. He appears well-developed and well-nourished. HENT:   Head: Normocephalic and atraumatic. Eyes: Pupils are equal, round, and reactive to light. EOM are normal.   Neck: Normal range of motion. Cardiovascular: Normal rate, regular rhythm and normal heart sounds. Pulmonary/Chest: Effort normal and breath sounds normal.   Abdominal: Bowel sounds are normal. There is no tenderness. Musculoskeletal:   Right foot bunion with mild erythema   Neurological: He is alert and oriented to person, place, and time. Psychiatric: He has a normal mood and affect. His behavior is normal. Judgment and thought content normal.       Plan   Diagnosis Orders   1. Controlled type 2 diabetes mellitus with complication, without long-term current use of insulin (HCC)  POCT microalbumin    POCT glycosylated hemoglobin (Hb A1C)   2. Hyperlipidemia with target LDL less than 70     3. Primary osteoarthritis involving multiple joints     4. Bunion, right foot       Patient asked about tramadol, and I counseled him that I do not prescribe it.

## 2018-08-17 PROBLEM — M15.9 PRIMARY OSTEOARTHRITIS INVOLVING MULTIPLE JOINTS: Status: ACTIVE | Noted: 2018-08-17

## 2018-08-20 NOTE — TELEPHONE ENCOUNTER
I spoke to pharmacist as there was another CNP who had this pended from hospital. The CNP stated to pharmacy that these needed to go to Dr Billy Tavera for refills.    LOV 08/16/2018      Future Appointments  Date Time Provider Ozzy Choi   9/25/2018 1:00 PM MD Gibran Fatima   10/11/2018 1:00 PM DO DAVID Paredes  [facility]

## 2018-08-22 RX ORDER — FUROSEMIDE 20 MG/1
20 TABLET ORAL 2 TIMES DAILY
Qty: 180 TABLET | Refills: 0 | Status: SHIPPED | OUTPATIENT
Start: 2018-08-22 | End: 2018-10-16 | Stop reason: SDUPTHER

## 2018-08-22 RX ORDER — SPIRONOLACTONE 25 MG/1
TABLET ORAL
Qty: 90 TABLET | Refills: 0 | Status: SHIPPED | OUTPATIENT
Start: 2018-08-22 | End: 2018-11-05 | Stop reason: SDUPTHER

## 2018-08-22 RX ORDER — LISINOPRIL 20 MG/1
TABLET ORAL
Qty: 90 TABLET | Refills: 0 | Status: SHIPPED | OUTPATIENT
Start: 2018-08-22 | End: 2018-11-05 | Stop reason: SDUPTHER

## 2018-08-22 NOTE — TELEPHONE ENCOUNTER
Please call patient and let him know that I have refilled his medications, however, I need for him to have his labs done that I ordered and I will need to check his potassium levels every 6 months.

## 2018-08-23 DIAGNOSIS — Z00.00 ENCOUNTER FOR MEDICARE ANNUAL WELLNESS EXAM: Primary | ICD-10-CM

## 2018-08-23 DIAGNOSIS — Z12.5 PROSTATE CANCER SCREENING: ICD-10-CM

## 2018-08-28 ENCOUNTER — TELEPHONE (OUTPATIENT)
Dept: PRIMARY CARE CLINIC | Age: 63
End: 2018-08-28

## 2018-09-06 NOTE — TELEPHONE ENCOUNTER
No there is no other medication listed on the denial letter. Patient needs to pay for it himself or get samples which we do not have.

## 2018-09-13 DIAGNOSIS — E78.5 HYPERLIPIDEMIA WITH TARGET LDL LESS THAN 70: ICD-10-CM

## 2018-09-13 DIAGNOSIS — Z12.5 PROSTATE CANCER SCREENING: ICD-10-CM

## 2018-09-13 DIAGNOSIS — E11.8 CONTROLLED TYPE 2 DIABETES MELLITUS WITH COMPLICATION, WITHOUT LONG-TERM CURRENT USE OF INSULIN (HCC): ICD-10-CM

## 2018-09-13 DIAGNOSIS — I50.22 SYSTOLIC CHF, CHRONIC (HCC): ICD-10-CM

## 2018-09-13 DIAGNOSIS — Z00.00 ENCOUNTER FOR MEDICARE ANNUAL WELLNESS EXAM: ICD-10-CM

## 2018-09-13 DIAGNOSIS — E78.1 HYPERTRIGLYCERIDEMIA: ICD-10-CM

## 2018-09-13 DIAGNOSIS — I25.10 CORONARY ARTERY DISEASE INVOLVING NATIVE CORONARY ARTERY OF NATIVE HEART WITHOUT ANGINA PECTORIS: ICD-10-CM

## 2018-09-13 DIAGNOSIS — R53.83 FATIGUE, UNSPECIFIED TYPE: ICD-10-CM

## 2018-09-13 DIAGNOSIS — M79.10 MYALGIA: ICD-10-CM

## 2018-09-13 DIAGNOSIS — I25.5 CARDIOMYOPATHY, ISCHEMIC: ICD-10-CM

## 2018-09-13 LAB
A/G RATIO: 1.9 (ref 1.1–2.2)
ALBUMIN SERPL-MCNC: 4.2 G/DL (ref 3.4–5)
ALP BLD-CCNC: 59 U/L (ref 40–129)
ALT SERPL-CCNC: 25 U/L (ref 10–40)
ANION GAP SERPL CALCULATED.3IONS-SCNC: 13 MMOL/L (ref 3–16)
AST SERPL-CCNC: 29 U/L (ref 15–37)
BASOPHILS ABSOLUTE: 0.1 K/UL (ref 0–0.2)
BASOPHILS RELATIVE PERCENT: 1 %
BILIRUB SERPL-MCNC: 0.3 MG/DL (ref 0–1)
BUN BLDV-MCNC: 12 MG/DL (ref 7–20)
CALCIUM SERPL-MCNC: 9.5 MG/DL (ref 8.3–10.6)
CHLORIDE BLD-SCNC: 100 MMOL/L (ref 99–110)
CHOLESTEROL, TOTAL: 137 MG/DL (ref 0–199)
CO2: 28 MMOL/L (ref 21–32)
CREAT SERPL-MCNC: 0.8 MG/DL (ref 0.8–1.3)
EOSINOPHILS ABSOLUTE: 0.1 K/UL (ref 0–0.6)
EOSINOPHILS RELATIVE PERCENT: 1 %
GFR AFRICAN AMERICAN: >60
GFR NON-AFRICAN AMERICAN: >60
GLOBULIN: 2.2 G/DL
GLUCOSE BLD-MCNC: 129 MG/DL (ref 70–99)
HCT VFR BLD CALC: 44.1 % (ref 40.5–52.5)
HDLC SERPL-MCNC: 52 MG/DL (ref 40–60)
HEMOGLOBIN: 14.8 G/DL (ref 13.5–17.5)
LDL CHOLESTEROL CALCULATED: 63 MG/DL
LYMPHOCYTES ABSOLUTE: 1.4 K/UL (ref 1–5.1)
LYMPHOCYTES RELATIVE PERCENT: 18 %
MCH RBC QN AUTO: 34 PG (ref 26–34)
MCHC RBC AUTO-ENTMCNC: 33.6 G/DL (ref 31–36)
MCV RBC AUTO: 101.4 FL (ref 80–100)
MONOCYTES ABSOLUTE: 0.6 K/UL (ref 0–1.3)
MONOCYTES RELATIVE PERCENT: 7.7 %
NEUTROPHILS ABSOLUTE: 5.5 K/UL (ref 1.7–7.7)
NEUTROPHILS RELATIVE PERCENT: 72.3 %
PDW BLD-RTO: 14.4 % (ref 12.4–15.4)
PLATELET # BLD: 312 K/UL (ref 135–450)
PMV BLD AUTO: 8.1 FL (ref 5–10.5)
POTASSIUM SERPL-SCNC: 5 MMOL/L (ref 3.5–5.1)
PRO-BNP: 514 PG/ML (ref 0–124)
PROSTATE SPECIFIC ANTIGEN: 1.33 NG/ML (ref 0–4)
RBC # BLD: 4.35 M/UL (ref 4.2–5.9)
SODIUM BLD-SCNC: 141 MMOL/L (ref 136–145)
T3 FREE: 3.3 PG/ML (ref 2.3–4.2)
T4 FREE: 1.6 NG/DL (ref 0.9–1.8)
TOTAL PROTEIN: 6.4 G/DL (ref 6.4–8.2)
TRIGL SERPL-MCNC: 108 MG/DL (ref 0–150)
TSH SERPL DL<=0.05 MIU/L-ACNC: 1.04 UIU/ML (ref 0.27–4.2)
VLDLC SERPL CALC-MCNC: 22 MG/DL
WBC # BLD: 7.6 K/UL (ref 4–11)

## 2018-09-25 ENCOUNTER — PROCEDURE VISIT (OUTPATIENT)
Dept: CARDIOLOGY CLINIC | Age: 63
End: 2018-09-25
Payer: MEDICARE

## 2018-09-25 ENCOUNTER — OFFICE VISIT (OUTPATIENT)
Dept: CARDIOLOGY CLINIC | Age: 63
End: 2018-09-25
Payer: MEDICARE

## 2018-09-25 VITALS
WEIGHT: 193 LBS | HEART RATE: 75 BPM | DIASTOLIC BLOOD PRESSURE: 68 MMHG | HEIGHT: 68 IN | OXYGEN SATURATION: 98 % | SYSTOLIC BLOOD PRESSURE: 114 MMHG | BODY MASS INDEX: 29.25 KG/M2

## 2018-09-25 DIAGNOSIS — Z72.0 TOBACCO ABUSE: ICD-10-CM

## 2018-09-25 DIAGNOSIS — Z95.810 AUTOMATIC IMPLANTABLE CARDIOVERTER-DEFIBRILLATOR IN SITU: Primary | ICD-10-CM

## 2018-09-25 DIAGNOSIS — Z98.890 H/O CAROTID ENDARTERECTOMY: ICD-10-CM

## 2018-09-25 DIAGNOSIS — I50.22 SYSTOLIC CHF, CHRONIC (HCC): Primary | ICD-10-CM

## 2018-09-25 DIAGNOSIS — I25.5 CARDIOMYOPATHY, ISCHEMIC: ICD-10-CM

## 2018-09-25 DIAGNOSIS — I25.10 CORONARY ARTERY DISEASE INVOLVING NATIVE HEART WITHOUT ANGINA PECTORIS, UNSPECIFIED VESSEL OR LESION TYPE: ICD-10-CM

## 2018-09-25 DIAGNOSIS — E11.8 CONTROLLED TYPE 2 DIABETES MELLITUS WITH COMPLICATION, WITHOUT LONG-TERM CURRENT USE OF INSULIN (HCC): ICD-10-CM

## 2018-09-25 DIAGNOSIS — E78.5 HYPERLIPIDEMIA WITH TARGET LDL LESS THAN 70: ICD-10-CM

## 2018-09-25 DIAGNOSIS — Z95.810 AUTOMATIC IMPLANTABLE CARDIOVERTER-DEFIBRILLATOR IN SITU: ICD-10-CM

## 2018-09-25 DIAGNOSIS — I25.5 ISCHEMIC CARDIOMYOPATHY: ICD-10-CM

## 2018-09-25 PROCEDURE — 93282 PRGRMG EVAL IMPLANTABLE DFB: CPT | Performed by: INTERNAL MEDICINE

## 2018-09-25 PROCEDURE — 2022F DILAT RTA XM EVC RTNOPTHY: CPT | Performed by: INTERNAL MEDICINE

## 2018-09-25 PROCEDURE — G8427 DOCREV CUR MEDS BY ELIG CLIN: HCPCS | Performed by: INTERNAL MEDICINE

## 2018-09-25 PROCEDURE — 4004F PT TOBACCO SCREEN RCVD TLK: CPT | Performed by: INTERNAL MEDICINE

## 2018-09-25 PROCEDURE — 99214 OFFICE O/P EST MOD 30 MIN: CPT | Performed by: INTERNAL MEDICINE

## 2018-09-25 PROCEDURE — G8598 ASA/ANTIPLAT THER USED: HCPCS | Performed by: INTERNAL MEDICINE

## 2018-09-25 PROCEDURE — 3017F COLORECTAL CA SCREEN DOC REV: CPT | Performed by: INTERNAL MEDICINE

## 2018-09-25 PROCEDURE — 3044F HG A1C LEVEL LT 7.0%: CPT | Performed by: INTERNAL MEDICINE

## 2018-09-25 PROCEDURE — G8419 CALC BMI OUT NRM PARAM NOF/U: HCPCS | Performed by: INTERNAL MEDICINE

## 2018-09-25 NOTE — PATIENT INSTRUCTIONS
Plan:   1. Discussed stopping atorvastatin for 4 weeks. If not change in symptoms, restart atorvastatin. If symptoms improve, call the office.   2. Follow up in 6 months

## 2018-09-25 NOTE — LETTER
[address] [facility] - [address] [place] [address] [address] [address]  Phone: [phone]  Fax: [phone]    Latrell Betancourt MD        September 27, 2018     Jan Marrero DO  [address]    Patient: Anna Rodriguez  MR Number: [id number]  YOB: 1955  Date of Visit: 9/25/2018    Dear Dr. Jan Marrero:    Below are the relevant portions of my assessment and plan of care. [employer]   Advanced Heart Failure/Pulmonary Hypertension  Cardiac Evaluation      Anna Rodriguez  YOB: 1955    Date of Visit:  9/25/18        Chief Complaint   Patient presents with    Congestive Heart Failure        History of Present Illness:  Anna Rodriguez is a 61 y.o. male who presents for follow up for CHF, CMP, CAD, s/p ICD. On 9/17/15 he stated that he felt bad. Stated that he felt good in the morning. He gets up, eats, has a cup of coffee and then he feels like he needs to lay back down because he feels fatigued and occasionally dizzy. He was unable to mow his lawn without having to take breaks and rest due to fatigue. Reported to living in a stressful environment, having marital problems. Also reported to having pain in his legs with exercising. Denied pain in his calves. Denied exertional chest pain, increased shortness of breath, edema, palpitations and syncope. Continued to smoke. Had quit drinking alcohol. Takes all medications as prescribed. Device check 9/16/15 showed good device function. He had a right carotid endarterectomy with Dr. Tammy Wright on 10/15/15 for significant carotid stenosis. Today he reports he drinks a natural energy and protein boost tea for some time. He drinks the tea around noon and again at 6 pm. He states he has decreased coffee, cigarettes and alcohol intake. He started losing weight due to the tea and no appetite.  He reports he has occasional 1.0 x 1.2 cm. Left common iliac artery measures 1.2 x 1.6 cm. Stress test: 9/24/15  Summary  There is a fixed defect within the inferior-lateral wall and apex suggestive  of prior infarction/fibrosis. There is no significant ischemia noted. The LV cavity is enlarged and has severely reduced left ventricular function  @34% with hypokinesis of the infero-lateral wall. CTA neck: 10/12/15  IMPRESSION:   Severe stenosis of the right internal carotid artery, in the range of 80 to 90%.    50% narrowing of left internal carotid artery   Mild emphysema    NYHA:  III  Allergies   Allergen Reactions    Celexa [Citalopram Hydrobromide]      Lack of concentration and not feel right     Current Outpatient Prescriptions   Medication Sig Dispense Refill    Guaifenesin-Codeine (GUAIFENESIN AC EXPECTORANT PO) Take by mouth      furosemide (LASIX) 20 MG tablet Take 1 tablet by mouth 2 times daily 180 tablet 0    spironolactone (ALDACTONE) 25 MG tablet TAKE ONE TABLET BY MOUTH DAILY 90 tablet 0    lisinopril (PRINIVIL;ZESTRIL) 20 MG tablet TAKE ONE TABLET BY MOUTH DAILY 90 tablet 0    atorvastatin (LIPITOR) 40 MG tablet TAKE 1 TABLET BY MOUTH ONE TIME A DAY  90 tablet 3    carvedilol (COREG) 25 MG tablet TAKE ONE TABLET BY MOUTH TWICE A DAY WITH MEALS 180 tablet 2    budesonide-formoterol (SYMBICORT) 160-4.5 MCG/ACT AERO Inhale 2 puffs into the lungs 2 times daily      nabumetone (RELAFEN) 750 MG tablet Take 750 mg by mouth daily      aspirin 325 MG tablet Take 1 tablet by mouth daily (Patient taking differently: Take 500 mg by mouth daily Takes 500 mg ASA one in the morning and one in the evening) 30 tablet 3    albuterol sulfate HFA (VENTOLIN HFA) 108 (90 BASE) MCG/ACT inhaler Inhale 2 puffs into the lungs every 6 hours as needed for Wheezing 1 Inhaler 0    albuterol (PROVENTIL) (2.5 MG/3ML) 0.083% nebulizer solution Take 3 mLs by nebulization every 6 hours as needed for Wheezing 120 each 0 Start date: 1/1/1970    Smokeless tobacco: Never Used    Alcohol use 0.6 - 1.2 oz/week     1 - 2 Cans of beer per week      Comment: pt states has not had any alcohol in several months    Drug use: No      Comment: 4 times a year. --hx of    Sexual activity: Yes     Partners: Female      Comment: Rare     Other Topics Concern    Not on file     Social History Narrative    ** Merged History Encounter **          Review of Systems:   · Constitutional: there has been no unanticipated weight loss. There's been no change in energy level, sleep pattern, or activity level. · Eyes: No visual changes or diplopia. No scleral icterus. · ENT: No Headaches, hearing loss or vertigo. No mouth sores or sore throat. · Cardiovascular: Reviewed in HPI  · Respiratory: No cough or wheezing, no sputum production. No hematemesis. · Gastrointestinal: No abdominal pain, appetite loss, blood in stools. No change in bowel or bladder habits. · Genitourinary: No dysuria, trouble voiding, or hematuria. · Musculoskeletal:  No gait disturbance, weakness or joint complaints. · Integumentary: No rash or pruritis. · Neurological: No headache, diplopia, change in muscle strength, numbness or tingling. No change in gait, balance, coordination, mood, affect, memory, mentation, behavior. · Psychiatric: No anxiety, no depression. · Endocrine: No malaise, fatigue or temperature intolerance. No excessive thirst, fluid intake, or urination. No tremor. · Hematologic/Lymphatic: No abnormal bruising or bleeding, blood clots or swollen lymph nodes. · Allergic/Immunologic: No nasal congestion or hives.     Physical Examination:    Vitals:    09/25/18 1258   BP: 114/68   Pulse: 75   SpO2: 98%   Weight: 193 lb (87.5 kg)   Height: 5' 8\" (1.727 m)        Constitutional and General Appearance: Warm and dry, no apparent distress, normal coloration  HEENT:  Normocephalic, atraumatic  Respiratory: · Normal excursion and expansion without use of accessory muscles  · Resp Auscultation: Normal breath sounds without dullness  Cardiovascular:  · The apical impulses not displaced  · Heart tones are crisp and normal  · JVP less than 8 cm H2O  · Regular rate and rhythm, normal S1S2, no m/g/r/c  · Peripheral pulses are symmetrical and full  · There is no clubbing, cyanosis of the extremities.   · No edema  · Pedal Pulses: 2+ and equal   Abdomen:  · No masses or tenderness  · Liver/Spleen: No Abnormalities Noted  Neurological/Psychiatric:  · Alert and oriented in all spheres  · Moves all extremities well  · Exhibits normal gait balance and coordination  · No abnormalities of mood, affect, memory, mentation, or behavior are noted    Lab Data:  CBC:   Lab Results   Component Value Date    WBC 7.6 09/13/2018    WBC 9.5 04/26/2018    WBC 7.2 04/25/2018    RBC 4.35 09/13/2018    RBC 3.86 04/26/2018    RBC 3.93 04/25/2018    HGB 14.8 09/13/2018    HGB 12.6 04/26/2018    HGB 12.9 04/25/2018    HCT 44.1 09/13/2018    HCT 36.8 04/26/2018    HCT 37.1 04/25/2018    .4 09/13/2018    MCV 95.1 04/26/2018    MCV 94.3 04/25/2018    RDW 14.4 09/13/2018    RDW 13.3 04/26/2018    RDW 13.0 04/25/2018     09/13/2018     04/26/2018     04/25/2018     BMP:   Lab Results   Component Value Date     09/13/2018     04/25/2018     03/31/2018    K 5.0 09/13/2018    K 4.2 04/25/2018    K 4.0 03/31/2018     09/13/2018     04/25/2018    CL 96 03/31/2018    CO2 28 09/13/2018    CO2 26 04/25/2018    CO2 25 03/31/2018    BUN 12 09/13/2018    BUN 11 04/25/2018    BUN 17 03/31/2018    CREATININE 0.8 09/13/2018    CREATININE 0.6 04/25/2018    CREATININE 0.8 03/31/2018     BNP:   Lab Results   Component Value Date    BNP 93.7 04/25/2013    .2 08/14/2012    .8 06/18/2012     PT/INR:   No results found for: PROTIME  FASTING LIPID PANEL:  Lab Results   Component Value Date CHOL 137 09/13/2018    HDL 52 09/13/2018    HDL 42 05/24/2012    TRIG 108 09/13/2018   Recent Labs (haley) 2/9/18  , LDL 54, HDL 31,     Assessment:    1. Cardiomyopathy, ischemic : 9/24/15 stress test showed LV cavity is enlarged and has severely reduced left ventricular function  @34% with hypokinesis of the infero-lateral wall     2. Benign hypertensive heart disease with heart failure: stable BP: 114/68     3. Hyperlipidemia: 12/21/15 , Triglycerides 173,  HDL 42,  LDL 66   4. CAD: stable   5. Tobacco abuse : cessation education given   6. CHF:  LV systolic dysfunction:  He is remarkably stable and has been doing a good job of controlling his Fluid weight. Coreg 25 mg po bid. Lisinopril 20 mg po bid for hypertension and CHF  7. ICD:  3/2/16 Interrogation and programming evaluation of the device shows normal function, with stable sensing and pacing thresholds. 8.  Carotid stenosis: right carotid endarterectomy with Dr. Victor Manuel Hurley on 10/15/15    Plan:   1. Discussed stopping atorvastatin for 4 weeks. If not change in symptoms, restart atorvastatin. If symptoms improve, call the office. 2. Smoking cessation discussed  3. Follow up in 6 months    QUALITY MEASURES  1. Tobacco Cessation Counseling: Yes  2. Retake of BP if >140/90:   NA  3. Documentation to PCP/referring for new patient:  Sent to PCP at close of office visit  4. CAD patient on anti-platelet: aspirin  5. CAD patient on STATIN therapy:  Yes  6. Patient with CHF and aFib on anticoagulation:  NA     I appreciate the opportunity of cooperating in the care of this individual.    Jacquie's attestation: This note was scribed in the presence of Neema Moody M.D. By Itzel Mackenzie RN     The jacquie's documentation has been prepared under my direction and personally reviewed by me in its entirety. I confirm that the note above accurately reflects all work, treatment, procedures, and medical decision making performed by me.

## 2018-09-25 NOTE — PROGRESS NOTES
[address]   Advanced Heart Failure/Pulmonary Hypertension  Cardiac Evaluation      Jake Holcomb  YOB: 1955    Date of Visit:  9/25/18        Chief Complaint   Patient presents with    Congestive Heart Failure        History of Present Illness:  Jake Holcomb is a 61 y.o. male who presents for follow up for CHF, CMP, CAD, s/p ICD. On 9/17/15 he stated that he felt bad. Stated that he felt good in the morning. He gets up, eats, has a cup of coffee and then he feels like he needs to lay back down because he feels fatigued and occasionally dizzy. He was unable to mow his lawn without having to take breaks and rest due to fatigue. Reported to living in a stressful environment, having marital problems. Also reported to having pain in his legs with exercising. Denied pain in his calves. Denied exertional chest pain, increased shortness of breath, edema, palpitations and syncope. Continued to smoke. Had quit drinking alcohol. Takes all medications as prescribed. Device check 9/16/15 showed good device function. He had a right carotid endarterectomy with Dr. Kirk Magaña on 10/15/15 for significant carotid stenosis. Today he reports he drinks a natural energy and protein boost tea for some time. He drinks the tea around noon and again at 6 pm. He states he has decreased coffee, cigarettes and alcohol intake. He started losing weight due to the tea and no appetite. He reports he has occasional palpitations if he drinks too much coffee. He denies CP, SOB, dizziness or syncope. He has followed up DR Kirk Magaña for his carotid and the splenic artery aneurysm. Dr Kirk Magaña is not concerned about the splenic artery aneurysm at this time. Recent Hospitalization or Testing:   Echo May 2012:  1. Moderate to marked LV systolic dysfunction, EF of approximately 25%. 2. Inferior akinesis and marked hypokinesis of the remaining segments. 3. LV dilatation and left atrial enlargement.    4. Mild to moderate MR.  5. Moderate TR with evidence for moderate to severe pulmonary hypertension. Echo: 13  Summary   Ejection fraction is moderately reduced estimated to be 30-35 % with more   prominent inferior wall HK. There is reversal of E/A inflow velocities across the mitral valve   suggesting impaired left ventricular relaxation. There is mild concentric left ventricular hypertrophy. Trivial mitral regurgitation is present. The left atrium is moderately dilated. There is trivial tricuspid regurgitation with RVSP estimated at 30 mmHg. Possible pacemaker / ICD lead was visualized in the right atrium. Stress testin13  IMPRESSION- Large old inferior wall infarct. No definite Lexiscan induced cardiac ischemia. Poor left ventricular functioning with LVEF 25%. Carotid US: 9/18/15  Summary    1. There is large plaque seen in the right internal carotid artery with an  estimated diameter reduction in the upper limits of >70%. 2. There is moderate plaque seen in the left internal carotid artery with an  estimated diameter reduction in the upper limits of <50%. 3. The vertebral arteries are patent with antegrade flow bilaterally. Abdominal aorta us: 9/18/15  FINDINGS: Proximal abdominal aorta measures approximately 2.6 x 2.1 cm in transverse and AP dimensions respectively. Mid aorta measures 2.4 x 2.1 cm. Distal aorta measures 1.8 x 1.8 cm. Right common iliac artery measures 1.0 x 1.2 cm. Left common iliac artery measures 1.2 x 1.6 cm. Stress test: 9/24/15  Summary  There is a fixed defect within the inferior-lateral wall and apex suggestive  of prior infarction/fibrosis. There is no significant ischemia noted. The LV cavity is enlarged and has severely reduced left ventricular function  @34% with hypokinesis of the infero-lateral wall. CTA neck: 10/12/15  IMPRESSION:   Severe stenosis of the right internal carotid artery, in the range of 80 to 90%.    50% narrowing of left level.     · Eyes: No visual changes or diplopia. No scleral icterus. · ENT: No Headaches, hearing loss or vertigo. No mouth sores or sore throat. · Cardiovascular: Reviewed in HPI  · Respiratory: No cough or wheezing, no sputum production. No hematemesis. · Gastrointestinal: No abdominal pain, appetite loss, blood in stools. No change in bowel or bladder habits. · Genitourinary: No dysuria, trouble voiding, or hematuria. · Musculoskeletal:  No gait disturbance, weakness or joint complaints. · Integumentary: No rash or pruritis. · Neurological: No headache, diplopia, change in muscle strength, numbness or tingling. No change in gait, balance, coordination, mood, affect, memory, mentation, behavior. · Psychiatric: No anxiety, no depression. · Endocrine: No malaise, fatigue or temperature intolerance. No excessive thirst, fluid intake, or urination. No tremor. · Hematologic/Lymphatic: No abnormal bruising or bleeding, blood clots or swollen lymph nodes. · Allergic/Immunologic: No nasal congestion or hives. Physical Examination:    Vitals:    09/25/18 1258   BP: 114/68   Pulse: 75   SpO2: 98%   Weight: 193 lb (87.5 kg)   Height: 5' 8\" (1.727 m)        Constitutional and General Appearance: Warm and dry, no apparent distress, normal coloration  HEENT:  Normocephalic, atraumatic  Respiratory:  · Normal excursion and expansion without use of accessory muscles  · Resp Auscultation: Normal breath sounds without dullness  Cardiovascular:  · The apical impulses not displaced  · Heart tones are crisp and normal  · JVP less than 8 cm H2O  · Regular rate and rhythm, normal S1S2, no m/g/r/c  · Peripheral pulses are symmetrical and full  · There is no clubbing, cyanosis of the extremities.   · No edema  · Pedal Pulses: 2+ and equal   Abdomen:  · No masses or tenderness  · Liver/Spleen: No Abnormalities Noted  Neurological/Psychiatric:  · Alert and oriented in all spheres  · Moves all extremities well  · Exhibits normal gait balance and coordination  · No abnormalities of mood, affect, memory, mentation, or behavior are noted    Lab Data:  CBC:   Lab Results   Component Value Date    WBC 7.6 09/13/2018    WBC 9.5 04/26/2018    WBC 7.2 04/25/2018    RBC 4.35 09/13/2018    RBC 3.86 04/26/2018    RBC 3.93 04/25/2018    HGB 14.8 09/13/2018    HGB 12.6 04/26/2018    HGB 12.9 04/25/2018    HCT 44.1 09/13/2018    HCT 36.8 04/26/2018    HCT 37.1 04/25/2018    .4 09/13/2018    MCV 95.1 04/26/2018    MCV 94.3 04/25/2018    RDW 14.4 09/13/2018    RDW 13.3 04/26/2018    RDW 13.0 04/25/2018     09/13/2018     04/26/2018     04/25/2018     BMP:   Lab Results   Component Value Date     09/13/2018     04/25/2018     03/31/2018    K 5.0 09/13/2018    K 4.2 04/25/2018    K 4.0 03/31/2018     09/13/2018     04/25/2018    CL 96 03/31/2018    CO2 28 09/13/2018    CO2 26 04/25/2018    CO2 25 03/31/2018    BUN 12 09/13/2018    BUN 11 04/25/2018    BUN 17 03/31/2018    CREATININE 0.8 09/13/2018    CREATININE 0.6 04/25/2018    CREATININE 0.8 03/31/2018     BNP:   Lab Results   Component Value Date    BNP 93.7 04/25/2013    .2 08/14/2012    .8 06/18/2012     PT/INR:   No results found for: PROTIME  FASTING LIPID PANEL:  Lab Results   Component Value Date    CHOL 137 09/13/2018    HDL 52 09/13/2018    HDL 42 05/24/2012    TRIG 108 09/13/2018   Recent Labs ([facility]aley) 2/9/18  , LDL 54, HDL 31,     Assessment:    1. Cardiomyopathy, ischemic : 9/24/15 stress test showed LV cavity is enlarged and has severely reduced left ventricular function  @34% with hypokinesis of the infero-lateral wall     2. Benign hypertensive heart disease with heart failure: stable BP: 114/68     3. Hyperlipidemia: 12/21/15 , Triglycerides 173,  HDL 42,  LDL 66   4. CAD: stable   5. Tobacco abuse : cessation education given   6.   CHF:  LV systolic dysfunction:  He is remarkably stable and has been doing a good job of controlling his Fluid weight. Coreg 25 mg po bid. Lisinopril 20 mg po bid for hypertension and CHF  7. ICD:  3/2/16 Interrogation and programming evaluation of the device shows normal function, with stable sensing and pacing thresholds. 8.  Carotid stenosis: right carotid endarterectomy with Dr. Victor Manuel Hurley on 10/15/15    Plan:   1. Discussed stopping atorvastatin for 4 weeks. If not change in symptoms, restart atorvastatin. If symptoms improve, call the office. 2. Smoking cessation discussed  3. Follow up in 6 months    QUALITY MEASURES  1. Tobacco Cessation Counseling: Yes  2. Retake of BP if >140/90:   NA  3. Documentation to PCP/referring for new patient:  Sent to PCP at close of office visit  4. CAD patient on anti-platelet: aspirin  5. CAD patient on STATIN therapy:  Yes  6. Patient with CHF and aFib on anticoagulation:  NA     I appreciate the opportunity of cooperating in the care of this individual.    Scribe's attestation: This note was scribed in the presence of Neema Moody M.D. By Itzel Mackenzie RN     The scribe's documentation has been prepared under my direction and personally reviewed by me in its entirety. I confirm that the note above accurately reflects all work, treatment, procedures, and medical decision making performed by me.       Dr. Betty Jain, 9/25/2018, 1:39 PM

## 2018-09-27 NOTE — COMMUNICATION BODY
internal carotid artery   Mild emphysema    NYHA:  III  Allergies   Allergen Reactions    Celexa [Citalopram Hydrobromide]      Lack of concentration and not feel right     Current Outpatient Prescriptions   Medication Sig Dispense Refill    Guaifenesin-Codeine (GUAIFENESIN AC EXPECTORANT PO) Take by mouth      furosemide (LASIX) 20 MG tablet Take 1 tablet by mouth 2 times daily 180 tablet 0    spironolactone (ALDACTONE) 25 MG tablet TAKE ONE TABLET BY MOUTH DAILY 90 tablet 0    lisinopril (PRINIVIL;ZESTRIL) 20 MG tablet TAKE ONE TABLET BY MOUTH DAILY 90 tablet 0    atorvastatin (LIPITOR) 40 MG tablet TAKE 1 TABLET BY MOUTH ONE TIME A DAY  90 tablet 3    carvedilol (COREG) 25 MG tablet TAKE ONE TABLET BY MOUTH TWICE A DAY WITH MEALS 180 tablet 2    budesonide-formoterol (SYMBICORT) 160-4.5 MCG/ACT AERO Inhale 2 puffs into the lungs 2 times daily      nabumetone (RELAFEN) 750 MG tablet Take 750 mg by mouth daily      aspirin 325 MG tablet Take 1 tablet by mouth daily (Patient taking differently: Take 500 mg by mouth daily Takes 500 mg ASA one in the morning and one in the evening) 30 tablet 3    albuterol sulfate HFA (VENTOLIN HFA) 108 (90 BASE) MCG/ACT inhaler Inhale 2 puffs into the lungs every 6 hours as needed for Wheezing 1 Inhaler 0    albuterol (PROVENTIL) (2.5 MG/3ML) 0.083% nebulizer solution Take 3 mLs by nebulization every 6 hours as needed for Wheezing 120 each 0    acetaminophen (TYLENOL) 325 MG tablet Take 650 mg by mouth every 6 hours as needed for Pain      metFORMIN (GLUCOPHAGE) 1000 MG tablet TAKE 1 TABLET BY MOUTH TWO TIMES A DAY WITH MEALS 75 tablet 11    potassium phosphate, monobasic, (K-PHOS) 500 MG tablet Take 500 mg by mouth daily. OTC      multivitamin (THERAGRAN) per tablet Take 1 tablet by mouth daily.       benzonatate (TESSALON) 100 MG capsule Take 100 mg by mouth 3 times daily as needed for Cough      sildenafil (REVATIO) 20 MG tablet Take 1 tablet by mouth as needed level.     · Eyes: No visual changes or diplopia. No scleral icterus. · ENT: No Headaches, hearing loss or vertigo. No mouth sores or sore throat. · Cardiovascular: Reviewed in HPI  · Respiratory: No cough or wheezing, no sputum production. No hematemesis. · Gastrointestinal: No abdominal pain, appetite loss, blood in stools. No change in bowel or bladder habits. · Genitourinary: No dysuria, trouble voiding, or hematuria. · Musculoskeletal:  No gait disturbance, weakness or joint complaints. · Integumentary: No rash or pruritis. · Neurological: No headache, diplopia, change in muscle strength, numbness or tingling. No change in gait, balance, coordination, mood, affect, memory, mentation, behavior. · Psychiatric: No anxiety, no depression. · Endocrine: No malaise, fatigue or temperature intolerance. No excessive thirst, fluid intake, or urination. No tremor. · Hematologic/Lymphatic: No abnormal bruising or bleeding, blood clots or swollen lymph nodes. · Allergic/Immunologic: No nasal congestion or hives. Physical Examination:    Vitals:    09/25/18 1258   BP: 114/68   Pulse: 75   SpO2: 98%   Weight: 193 lb (87.5 kg)   Height: 5' 8\" (1.727 m)        Constitutional and General Appearance: Warm and dry, no apparent distress, normal coloration  HEENT:  Normocephalic, atraumatic  Respiratory:  · Normal excursion and expansion without use of accessory muscles  · Resp Auscultation: Normal breath sounds without dullness  Cardiovascular:  · The apical impulses not displaced  · Heart tones are crisp and normal  · JVP less than 8 cm H2O  · Regular rate and rhythm, normal S1S2, no m/g/r/c  · Peripheral pulses are symmetrical and full  · There is no clubbing, cyanosis of the extremities.   · No edema  · Pedal Pulses: 2+ and equal   Abdomen:  · No masses or tenderness  · Liver/Spleen: No Abnormalities Noted  Neurological/Psychiatric:  · Alert and oriented in all spheres  · Moves all extremities well  · Exhibits normal gait balance and coordination  · No abnormalities of mood, affect, memory, mentation, or behavior are noted    Lab Data:  CBC:   Lab Results   Component Value Date    WBC 7.6 09/13/2018    WBC 9.5 04/26/2018    WBC 7.2 04/25/2018    RBC 4.35 09/13/2018    RBC 3.86 04/26/2018    RBC 3.93 04/25/2018    HGB 14.8 09/13/2018    HGB 12.6 04/26/2018    HGB 12.9 04/25/2018    HCT 44.1 09/13/2018    HCT 36.8 04/26/2018    HCT 37.1 04/25/2018    .4 09/13/2018    MCV 95.1 04/26/2018    MCV 94.3 04/25/2018    RDW 14.4 09/13/2018    RDW 13.3 04/26/2018    RDW 13.0 04/25/2018     09/13/2018     04/26/2018     04/25/2018     BMP:   Lab Results   Component Value Date     09/13/2018     04/25/2018     03/31/2018    K 5.0 09/13/2018    K 4.2 04/25/2018    K 4.0 03/31/2018     09/13/2018     04/25/2018    CL 96 03/31/2018    CO2 28 09/13/2018    CO2 26 04/25/2018    CO2 25 03/31/2018    BUN 12 09/13/2018    BUN 11 04/25/2018    BUN 17 03/31/2018    CREATININE 0.8 09/13/2018    CREATININE 0.6 04/25/2018    CREATININE 0.8 03/31/2018     BNP:   Lab Results   Component Value Date    BNP 93.7 04/25/2013    .2 08/14/2012    .8 06/18/2012     PT/INR:   No results found for: PROTIME  FASTING LIPID PANEL:  Lab Results   Component Value Date    CHOL 137 09/13/2018    HDL 52 09/13/2018    HDL 42 05/24/2012    TRIG 108 09/13/2018   Recent Labs ([facility]aley) 2/9/18  , LDL 54, HDL 31,     Assessment:    1. Cardiomyopathy, ischemic : 9/24/15 stress test showed LV cavity is enlarged and has severely reduced left ventricular function  @34% with hypokinesis of the infero-lateral wall     2. Benign hypertensive heart disease with heart failure: stable BP: 114/68     3. Hyperlipidemia: 12/21/15 , Triglycerides 173,  HDL 42,  LDL 66   4. CAD: stable   5. Tobacco abuse : cessation education given   6.   CHF:  LV systolic dysfunction:  He is remarkably stable and has

## 2018-10-10 ENCOUNTER — OFFICE VISIT (OUTPATIENT)
Dept: FAMILY MEDICINE CLINIC | Age: 63
End: 2018-10-10
Payer: MEDICARE

## 2018-10-10 VITALS
TEMPERATURE: 98.3 F | RESPIRATION RATE: 16 BRPM | DIASTOLIC BLOOD PRESSURE: 80 MMHG | WEIGHT: 183 LBS | HEIGHT: 72 IN | BODY MASS INDEX: 24.79 KG/M2 | SYSTOLIC BLOOD PRESSURE: 138 MMHG | HEART RATE: 84 BPM

## 2018-10-10 DIAGNOSIS — E11.8 CONTROLLED TYPE 2 DIABETES MELLITUS WITH COMPLICATION, WITHOUT LONG-TERM CURRENT USE OF INSULIN (HCC): Primary | ICD-10-CM

## 2018-10-10 DIAGNOSIS — Z00.00 ROUTINE GENERAL MEDICAL EXAMINATION AT A HEALTH CARE FACILITY: Primary | ICD-10-CM

## 2018-10-10 PROCEDURE — G0438 PPPS, INITIAL VISIT: HCPCS | Performed by: FAMILY MEDICINE

## 2018-10-10 PROCEDURE — G8598 ASA/ANTIPLAT THER USED: HCPCS | Performed by: FAMILY MEDICINE

## 2018-10-10 ASSESSMENT — LIFESTYLE VARIABLES
HOW OFTEN DURING THE LAST YEAR HAVE YOU FAILED TO DO WHAT WAS NORMALLY EXPECTED FROM YOU BECAUSE OF DRINKING: 0
HOW OFTEN DURING THE LAST YEAR HAVE YOU NEEDED AN ALCOHOLIC DRINK FIRST THING IN THE MORNING TO GET YOURSELF GOING AFTER A NIGHT OF HEAVY DRINKING: 0
HOW OFTEN DO YOU HAVE SIX OR MORE DRINKS ON ONE OCCASION: 1
AUDIT-C TOTAL SCORE: 4
HOW OFTEN DURING THE LAST YEAR HAVE YOU HAD A FEELING OF GUILT OR REMORSE AFTER DRINKING: 0
HAVE YOU OR SOMEONE ELSE BEEN INJURED AS A RESULT OF YOUR DRINKING: 2
HAS A RELATIVE, FRIEND, DOCTOR, OR ANOTHER HEALTH PROFESSIONAL EXPRESSED CONCERN ABOUT YOUR DRINKING OR SUGGESTED YOU CUT DOWN: 2
HOW OFTEN DURING THE LAST YEAR HAVE YOU BEEN UNABLE TO REMEMBER WHAT HAPPENED THE NIGHT BEFORE BECAUSE YOU HAD BEEN DRINKING: 0
AUDIT TOTAL SCORE: 9
HOW OFTEN DO YOU HAVE A DRINK CONTAINING ALCOHOL: 3
HOW MANY STANDARD DRINKS CONTAINING ALCOHOL DO YOU HAVE ON A TYPICAL DAY: 0
HOW OFTEN DURING THE LAST YEAR HAVE YOU FOUND THAT YOU WERE NOT ABLE TO STOP DRINKING ONCE YOU HAD STARTED: 1

## 2018-10-10 ASSESSMENT — PATIENT HEALTH QUESTIONNAIRE - PHQ9
SUM OF ALL RESPONSES TO PHQ QUESTIONS 1-9: 0
SUM OF ALL RESPONSES TO PHQ QUESTIONS 1-9: 0

## 2018-10-10 ASSESSMENT — ANXIETY QUESTIONNAIRES: GAD7 TOTAL SCORE: 0

## 2018-10-10 NOTE — PROGRESS NOTES
Habits/Nutrition:  Health Habits/Nutrition  Do you exercise for at least 20 minutes 2-3 times per week?: Yes (active lifestyle)  Have you lost any weight without trying in the past 3 months?: (!) Yes (herbal supplement - high fiber & protein, natural extract - side effect losing weight)  Do you eat fewer than 2 meals per day?: No  Have you seen a dentist within the past year?: (!) No (has dentures - has not gone in 16 yrs)  Body mass index is 24.82 kg/m². Health Habits/Nutrition Interventions:  · Pt has dentures and will see dentist for a new set. Safety:  Safety  Do you have working smoke detectors?: Yes  Have all throw rugs been removed or fastened?: Yes  Do you have non-slip mats in all bathtubs?: (!) No  Do all of your stairways have a railing or banister?: Yes (no stairs)  Are your doorways, halls and stairs free of clutter?: Yes  Do you always fasten your seatbelt when you are in a car?: Yes  Safety Interventions:  · Home safety tips provided, will use stickers for his porcelain tub for traction.     Personalized Preventive Plan   Current Health Maintenance Status  Immunization History   Administered Date(s) Administered    Influenza Virus Vaccine 01/22/2007, 11/11/2015, 01/28/2018    Pneumococcal 13-valent Conjugate ([id number]) 02/21/2018    Pneumococcal Polysaccharide ([id number]) 11/11/2015    Tdap (Boostrix, Adacel) 04/16/2010        Health Maintenance   Topic Date Due    Hepatitis C screen  1955    HIV screen  04/18/1970    Shingles Vaccine (1 of 2 - 2 Dose Series) 04/18/2005    Low dose CT lung screening  04/18/2010    Diabetic foot exam  12/21/2016    Diabetic retinal exam  12/21/2016    Colon Cancer Screen FIT/FOBT  05/19/2017    Flu vaccine (1) 09/01/2018    A1C test (Diabetic or Prediabetic)  08/16/2019    Diabetic microalbuminuria test  08/16/2019    Lipid screen  09/13/2019    Potassium monitoring  09/13/2019    Creatinine monitoring  09/13/2019    DTaP/Tdap/Td vaccine

## 2018-10-10 NOTE — PATIENT INSTRUCTIONS
Personalized Preventive Plan for Jaziel Humphrey - 10/10/2018  Medicare offers a range of preventive health benefits. Some of the tests and screenings are paid in full while other may be subject to a deductible, co-insurance, and/or copay. Some of these benefits include a comprehensive review of your medical history including lifestyle, illnesses that may run in your family, and various assessments and screenings as appropriate. After reviewing your medical record and screening and assessments performed today your provider may have ordered immunizations, labs, imaging, and/or referrals for you. A list of these orders (if applicable) as well as your Preventive Care list are included within your After Visit Summary for your review. Other Preventive Recommendations:    · A preventive eye exam performed by an eye specialist is recommended every 1-2 years to screen for glaucoma; cataracts, macular degeneration, and other eye disorders. · A preventive dental visit is recommended every 6 months. · Try to get at least 150 minutes of exercise per week or 10,000 steps per day on a pedometer . · Order or download the FREE \"Exercise & Physical Activity: Your Everyday Guide\" from [employer]. Call 7-958.170.8226 or search [employer] online. · You need 9510-2705 mg of calcium and 7272-1230 IU of vitamin D per day. It is possible to meet your calcium requirement with diet alone, but a vitamin D supplement is usually necessary to meet this goal.  · When exposed to the sun, use a sunscreen that protects against both UVA and UVB radiation with an SPF of 30 or greater. Reapply every 2 to 3 hours or after sweating, drying off with a towel, or swimming. · Always wear a seat belt when traveling in a car. Always wear a helmet when riding a bicycle or motorcycle.

## 2018-10-12 ENCOUNTER — TELEPHONE (OUTPATIENT)
Dept: FAMILY MEDICINE CLINIC | Age: 63
End: 2018-10-12

## 2018-10-16 RX ORDER — FUROSEMIDE 20 MG/1
TABLET ORAL
Qty: 180 TABLET | Refills: 0 | Status: SHIPPED | OUTPATIENT
Start: 2018-10-16 | End: 2019-02-20 | Stop reason: SDUPTHER

## 2018-10-16 NOTE — TELEPHONE ENCOUNTER
Future Appointments  Date Time Provider Ozzy Choi   12/12/2018 1:00 PM DO DAVID Carcamo  [address] [facility]     LOV 8/16/2018

## 2018-11-06 RX ORDER — SPIRONOLACTONE 25 MG/1
TABLET ORAL
Qty: 90 TABLET | Refills: 0 | Status: SHIPPED | OUTPATIENT
Start: 2018-11-06 | End: 2019-02-15 | Stop reason: SDUPTHER

## 2018-11-06 RX ORDER — LISINOPRIL 20 MG/1
TABLET ORAL
Qty: 90 TABLET | Refills: 0 | Status: SHIPPED | OUTPATIENT
Start: 2018-11-06 | End: 2019-02-07 | Stop reason: SDUPTHER

## 2018-11-28 ENCOUNTER — TELEPHONE (OUTPATIENT)
Dept: FAMILY MEDICINE CLINIC | Age: 63
End: 2018-11-28

## 2019-01-22 ENCOUNTER — TELEPHONE (OUTPATIENT)
Dept: CARDIOLOGY CLINIC | Age: 64
End: 2019-01-22

## 2019-02-01 ENCOUNTER — OFFICE VISIT (OUTPATIENT)
Dept: FAMILY MEDICINE CLINIC | Age: 64
End: 2019-02-01
Payer: MEDICARE

## 2019-02-01 ENCOUNTER — HOSPITAL ENCOUNTER (OUTPATIENT)
Dept: GENERAL RADIOLOGY | Age: 64
Discharge: HOME | End: 2019-02-01
Payer: MEDICARE

## 2019-02-01 VITALS
RESPIRATION RATE: 14 BRPM | DIASTOLIC BLOOD PRESSURE: 76 MMHG | OXYGEN SATURATION: 96 % | HEIGHT: 68 IN | HEART RATE: 74 BPM | SYSTOLIC BLOOD PRESSURE: 134 MMHG | BODY MASS INDEX: 27.89 KG/M2 | WEIGHT: 184 LBS

## 2019-02-01 DIAGNOSIS — R20.2 NUMBNESS AND TINGLING OF RIGHT ARM: ICD-10-CM

## 2019-02-01 DIAGNOSIS — R20.0 NUMBNESS AND TINGLING OF RIGHT ARM: ICD-10-CM

## 2019-02-01 DIAGNOSIS — E11.8 CONTROLLED TYPE 2 DIABETES MELLITUS WITH COMPLICATION, WITHOUT LONG-TERM CURRENT USE OF INSULIN (HCC): ICD-10-CM

## 2019-02-01 DIAGNOSIS — I50.22 SYSTOLIC CHF, CHRONIC (HCC): ICD-10-CM

## 2019-02-01 DIAGNOSIS — J44.9 MODERATE COPD (CHRONIC OBSTRUCTIVE PULMONARY DISEASE) (HCC): ICD-10-CM

## 2019-02-01 DIAGNOSIS — M25.522 ELBOW PAIN, LEFT: Primary | ICD-10-CM

## 2019-02-01 DIAGNOSIS — I72.2 RENAL ARTERY ANEURYSM (HCC): ICD-10-CM

## 2019-02-01 DIAGNOSIS — M25.522 ELBOW PAIN, LEFT: ICD-10-CM

## 2019-02-01 PROBLEM — M25.422: Status: ACTIVE | Noted: 2019-02-01

## 2019-02-01 PROCEDURE — 99214 OFFICE O/P EST MOD 30 MIN: CPT | Performed by: FAMILY MEDICINE

## 2019-02-01 PROCEDURE — G8926 SPIRO NO PERF OR DOC: HCPCS | Performed by: FAMILY MEDICINE

## 2019-02-01 PROCEDURE — 3046F HEMOGLOBIN A1C LEVEL >9.0%: CPT | Performed by: FAMILY MEDICINE

## 2019-02-01 PROCEDURE — 73070 X-RAY EXAM OF ELBOW: CPT

## 2019-02-01 PROCEDURE — 2022F DILAT RTA XM EVC RTNOPTHY: CPT | Performed by: FAMILY MEDICINE

## 2019-02-01 PROCEDURE — 3017F COLORECTAL CA SCREEN DOC REV: CPT | Performed by: FAMILY MEDICINE

## 2019-02-01 PROCEDURE — 4004F PT TOBACCO SCREEN RCVD TLK: CPT | Performed by: FAMILY MEDICINE

## 2019-02-01 PROCEDURE — 3023F SPIROM DOC REV: CPT | Performed by: FAMILY MEDICINE

## 2019-02-01 PROCEDURE — G8599 NO ASA/ANTIPLAT THER USE RNG: HCPCS | Performed by: FAMILY MEDICINE

## 2019-02-01 PROCEDURE — G8427 DOCREV CUR MEDS BY ELIG CLIN: HCPCS | Performed by: FAMILY MEDICINE

## 2019-02-01 PROCEDURE — G8419 CALC BMI OUT NRM PARAM NOF/U: HCPCS | Performed by: FAMILY MEDICINE

## 2019-02-01 PROCEDURE — G8484 FLU IMMUNIZE NO ADMIN: HCPCS | Performed by: FAMILY MEDICINE

## 2019-02-01 RX ORDER — TRAMADOL HYDROCHLORIDE 50 MG/1
50 TABLET ORAL EVERY 6 HOURS PRN
COMMUNITY

## 2019-02-04 ENCOUNTER — TELEPHONE (OUTPATIENT)
Dept: FAMILY MEDICINE CLINIC | Age: 64
End: 2019-02-04

## 2019-02-05 DIAGNOSIS — E11.8 CONTROLLED TYPE 2 DIABETES MELLITUS WITH COMPLICATION, WITHOUT LONG-TERM CURRENT USE OF INSULIN (HCC): ICD-10-CM

## 2019-02-05 LAB
A/G RATIO: 2 (ref 1.1–2.2)
ALBUMIN SERPL-MCNC: 4.2 G/DL (ref 3.4–5)
ALP BLD-CCNC: 53 U/L (ref 40–129)
ALT SERPL-CCNC: 10 U/L (ref 10–40)
ANION GAP SERPL CALCULATED.3IONS-SCNC: 13 MMOL/L (ref 3–16)
AST SERPL-CCNC: 13 U/L (ref 15–37)
BILIRUB SERPL-MCNC: <0.2 MG/DL (ref 0–1)
BUN BLDV-MCNC: 18 MG/DL (ref 7–20)
CALCIUM SERPL-MCNC: 9.4 MG/DL (ref 8.3–10.6)
CHLORIDE BLD-SCNC: 103 MMOL/L (ref 99–110)
CHOLESTEROL, TOTAL: 210 MG/DL (ref 0–199)
CO2: 27 MMOL/L (ref 21–32)
CREAT SERPL-MCNC: 0.7 MG/DL (ref 0.8–1.3)
GFR AFRICAN AMERICAN: >60
GFR NON-AFRICAN AMERICAN: >60
GLOBULIN: 2.1 G/DL
GLUCOSE BLD-MCNC: 125 MG/DL (ref 70–99)
HDLC SERPL-MCNC: 47 MG/DL (ref 40–60)
LDL CHOLESTEROL CALCULATED: 121 MG/DL
POTASSIUM SERPL-SCNC: 4.7 MMOL/L (ref 3.5–5.1)
SODIUM BLD-SCNC: 143 MMOL/L (ref 136–145)
TOTAL PROTEIN: 6.3 G/DL (ref 6.4–8.2)
TRIGL SERPL-MCNC: 212 MG/DL (ref 0–150)
TSH SERPL DL<=0.05 MIU/L-ACNC: 0.81 UIU/ML (ref 0.27–4.2)
VLDLC SERPL CALC-MCNC: 42 MG/DL

## 2019-02-07 DIAGNOSIS — I11.0 BENIGN HYPERTENSIVE HEART DISEASE WITH HEART FAILURE (HCC): Primary | ICD-10-CM

## 2019-02-07 RX ORDER — LISINOPRIL 20 MG/1
TABLET ORAL
Qty: 90 TABLET | Refills: 0 | Status: SHIPPED | OUTPATIENT
Start: 2019-02-07

## 2019-02-08 DIAGNOSIS — E78.2 MIXED HYPERLIPIDEMIA: Primary | ICD-10-CM

## 2019-02-08 RX ORDER — ATORVASTATIN CALCIUM 80 MG/1
80 TABLET, FILM COATED ORAL DAILY
Qty: 30 TABLET | Refills: 3 | Status: SHIPPED | OUTPATIENT
Start: 2019-02-08

## 2019-02-15 ENCOUNTER — TELEPHONE (OUTPATIENT)
Dept: FAMILY MEDICINE CLINIC | Age: 64
End: 2019-02-15

## 2019-02-15 RX ORDER — SPIRONOLACTONE 25 MG/1
TABLET ORAL
Qty: 90 TABLET | Refills: 0 | Status: SHIPPED | OUTPATIENT
Start: 2019-02-15

## 2019-02-20 RX ORDER — FUROSEMIDE 20 MG/1
TABLET ORAL
Qty: 180 TABLET | Refills: 0 | Status: SHIPPED | OUTPATIENT
Start: 2019-02-20

## 2019-03-26 ENCOUNTER — OFFICE VISIT (OUTPATIENT)
Dept: CARDIOLOGY CLINIC | Age: 64
End: 2019-03-26
Payer: MEDICARE

## 2019-03-26 ENCOUNTER — PROCEDURE VISIT (OUTPATIENT)
Dept: CARDIOLOGY CLINIC | Age: 64
End: 2019-03-26
Payer: MEDICARE

## 2019-03-26 VITALS
BODY MASS INDEX: 27.28 KG/M2 | SYSTOLIC BLOOD PRESSURE: 112 MMHG | OXYGEN SATURATION: 97 % | DIASTOLIC BLOOD PRESSURE: 70 MMHG | HEIGHT: 68 IN | HEART RATE: 79 BPM | WEIGHT: 180 LBS

## 2019-03-26 DIAGNOSIS — I50.22 SYSTOLIC CHF, CHRONIC (HCC): Primary | ICD-10-CM

## 2019-03-26 DIAGNOSIS — Z95.810 AUTOMATIC IMPLANTABLE CARDIOVERTER-DEFIBRILLATOR IN SITU: ICD-10-CM

## 2019-03-26 DIAGNOSIS — I25.5 CARDIOMYOPATHY, ISCHEMIC: ICD-10-CM

## 2019-03-26 DIAGNOSIS — I25.10 CORONARY ARTERY DISEASE INVOLVING NATIVE HEART WITHOUT ANGINA PECTORIS, UNSPECIFIED VESSEL OR LESION TYPE: ICD-10-CM

## 2019-03-26 DIAGNOSIS — Z72.0 TOBACCO ABUSE: ICD-10-CM

## 2019-03-26 DIAGNOSIS — J44.9 MODERATE COPD (CHRONIC OBSTRUCTIVE PULMONARY DISEASE) (HCC): ICD-10-CM

## 2019-03-26 DIAGNOSIS — I25.5 ISCHEMIC CARDIOMYOPATHY: ICD-10-CM

## 2019-03-26 PROCEDURE — G8427 DOCREV CUR MEDS BY ELIG CLIN: HCPCS | Performed by: INTERNAL MEDICINE

## 2019-03-26 PROCEDURE — G8926 SPIRO NO PERF OR DOC: HCPCS | Performed by: INTERNAL MEDICINE

## 2019-03-26 PROCEDURE — 3023F SPIROM DOC REV: CPT | Performed by: INTERNAL MEDICINE

## 2019-03-26 PROCEDURE — G8599 NO ASA/ANTIPLAT THER USE RNG: HCPCS | Performed by: INTERNAL MEDICINE

## 2019-03-26 PROCEDURE — G8419 CALC BMI OUT NRM PARAM NOF/U: HCPCS | Performed by: INTERNAL MEDICINE

## 2019-03-26 PROCEDURE — 4004F PT TOBACCO SCREEN RCVD TLK: CPT | Performed by: INTERNAL MEDICINE

## 2019-03-26 PROCEDURE — 3017F COLORECTAL CA SCREEN DOC REV: CPT | Performed by: INTERNAL MEDICINE

## 2019-03-26 PROCEDURE — 99214 OFFICE O/P EST MOD 30 MIN: CPT | Performed by: INTERNAL MEDICINE

## 2019-03-26 PROCEDURE — G8484 FLU IMMUNIZE NO ADMIN: HCPCS | Performed by: INTERNAL MEDICINE

## 2019-03-26 PROCEDURE — 93282 PRGRMG EVAL IMPLANTABLE DFB: CPT | Performed by: INTERNAL MEDICINE

## 2021-10-10 NOTE — TELEPHONE ENCOUNTER
Called patient regarding his carotid duplex scan date and time. Scheduled for 4/10/18 at 11:30am/arrive at 11:00am at [place] [place]
PROVIDER:[TOKEN:[[id number]],FOLLOWUP:[Urgent]]